# Patient Record
Sex: FEMALE | Race: WHITE | NOT HISPANIC OR LATINO | Employment: UNEMPLOYED | URBAN - METROPOLITAN AREA
[De-identification: names, ages, dates, MRNs, and addresses within clinical notes are randomized per-mention and may not be internally consistent; named-entity substitution may affect disease eponyms.]

---

## 2018-03-16 ENCOUNTER — HOSPITAL ENCOUNTER (OUTPATIENT)
Dept: RADIOLOGY | Facility: HOSPITAL | Age: 18
Discharge: HOME | End: 2018-03-16
Payer: COMMERCIAL

## 2018-03-16 ENCOUNTER — TRANSCRIBE ORDERS (OUTPATIENT)
Dept: ADMINISTRATIVE | Facility: HOSPITAL | Age: 18
End: 2018-03-16

## 2018-03-16 DIAGNOSIS — M06.09 RHEUMATOID ARTHRITIS OF MULTIPLE SITES WITHOUT RHEUMATOID FACTOR (HCC): Primary | ICD-10-CM

## 2018-03-16 DIAGNOSIS — M06.09 RHEUMATOID ARTHRITIS OF MULTIPLE SITES WITHOUT RHEUMATOID FACTOR (HCC): ICD-10-CM

## 2018-03-16 PROCEDURE — 73110 X-RAY EXAM OF WRIST: CPT

## 2018-03-16 PROCEDURE — 73630 X-RAY EXAM OF FOOT: CPT

## 2018-03-16 PROCEDURE — 73130 X-RAY EXAM OF HAND: CPT

## 2018-03-27 ENCOUNTER — TRANSCRIBE ORDERS (OUTPATIENT)
Dept: ADMINISTRATIVE | Facility: HOSPITAL | Age: 18
End: 2018-03-27

## 2018-03-27 DIAGNOSIS — M06.09 RHEUMATOID ARTHRITIS OF MULTIPLE SITES WITHOUT RHEUMATOID FACTOR (HCC): Primary | ICD-10-CM

## 2018-03-27 DIAGNOSIS — M79.671 RIGHT FOOT PAIN: ICD-10-CM

## 2018-04-03 ENCOUNTER — HOSPITAL ENCOUNTER (OUTPATIENT)
Dept: RADIOLOGY | Facility: HOSPITAL | Age: 18
Discharge: HOME | End: 2018-04-03

## 2018-05-16 ENCOUNTER — OFFICE VISIT (OUTPATIENT)
Dept: OBGYN CLINIC | Facility: CLINIC | Age: 18
End: 2018-05-16
Payer: COMMERCIAL

## 2018-05-16 VITALS
DIASTOLIC BLOOD PRESSURE: 78 MMHG | HEIGHT: 66 IN | BODY MASS INDEX: 27.64 KG/M2 | SYSTOLIC BLOOD PRESSURE: 126 MMHG | WEIGHT: 172 LBS

## 2018-05-16 DIAGNOSIS — N92.6 IRREGULAR MENSES: ICD-10-CM

## 2018-05-16 DIAGNOSIS — Z01.419 ENCNTR FOR GYN EXAM (GENERAL) (ROUTINE) W/O ABN FINDINGS: Primary | ICD-10-CM

## 2018-05-16 PROCEDURE — 99385 PREV VISIT NEW AGE 18-39: CPT | Performed by: NURSE PRACTITIONER

## 2018-05-16 RX ORDER — ERGOCALCIFEROL 1.25 MG/1
1 CAPSULE ORAL WEEKLY
Refills: 0 | COMMUNITY
Start: 2018-04-19 | End: 2019-05-14 | Stop reason: ALTCHOICE

## 2018-05-16 RX ORDER — PANTOPRAZOLE SODIUM 40 MG/1
1 TABLET, DELAYED RELEASE ORAL DAILY
COMMUNITY
Start: 2018-05-13 | End: 2019-05-15

## 2018-05-16 RX ORDER — MELOXICAM 7.5 MG/1
1 TABLET ORAL DAILY
COMMUNITY
Start: 2018-05-16 | End: 2019-05-15

## 2018-05-16 NOTE — PROGRESS NOTES
Assessment/Plan   Diagnoses and all orders for this visit:    Encntr for gyn exam (general) (routine) w/o abn findings    Irregular menses  -     Norethin-Eth Estrad-Fe Biphas 1 MG-10 MCG / 10 MCG TABS; Take 1 tablet by mouth daily    Other orders  -     meloxicam (MOBIC) 7 5 mg tablet; Take 1 tablet by mouth daily  -     pantoprazole (PROTONIX) 40 mg tablet; Take 1 tablet by mouth daily  -     ergocalciferol (VITAMIN D2) 50,000 units; Take 1 capsule by mouth once a week  -     Etonogestrel (NEXPLANON) 68 MG IMPL; Inject 68 mg under the skin continuous      Discussion    Reviewed with patient normal exam today  Reviewed monthly SBEs  Encourage safe sexual practices; STD testing done today  Will try one month of Lo Loestrin Fe to help with irregular bleeding on Nexplanon  Pap smears will start at age 22 per the ASCCP guidelines  All questions have been answered to her satisfaction  RTO for annul or sooner if needed    Subjective     Jake Lewis is a 19 y o  Female new partient who presents for annual well woman exam    Last exam: has never had one  Pap guidelines reviewed with patient, no pap needed until age of 22  Pt denies any abnormal vaginal discharge, itching, or odor  Pt in a mutually exclusive relationship with a male partner x 2 years and denies the need for STD testing today  Pt recently had STD testing at PCP one month ago was all negative  Denies a history of an STD in the past    Menstrual Cycle:   LMP: 18   Period Pattern: (!) Irregular (has Nexplanon in place)  Menstrual Flow: Light  Menstrual Control: Panty liner  Dysmenorrhea: None  OB History      Para Term  AB Living    0 0 0 0 0 0    SAB TAB Ectopic Multiple Live Births    0 0 0 0 0    Contraception: Nexplanon was placed 2017 working well would like to continue only complaint is irregular bleeding   Pt states she had no bleeding first 6 months after placement then had what seemed like a regular menses then has been bleeding since with the longest time of no bleeding having been 1 week  Pt states bleeding is not enough to wear a pad or tampon usually just light spotting  Practices monthly SBEs, no breast complaints today  Denies any bowel or bladder issues  Pt follows with PCP for regular check-ups and blood work  Review of Systems   Genitourinary: Positive for menstrual problem  Musculoskeletal: Positive for arthralgias  All other systems reviewed and are negative  The following portions of the patient's history were reviewed and updated as appropriate: allergies, current medications, past family history, past medical history, past social history, past surgical history and problem list     History reviewed  No pertinent past medical history  History reviewed  No pertinent surgical history  Family History   Problem Relation Age of Onset    Breast cancer Other        Social History     Social History    Marital status: Single     Spouse name: N/A    Number of children: N/A    Years of education: N/A     Occupational History    Not on file       Social History Main Topics    Smoking status: Never Smoker    Smokeless tobacco: Never Used    Alcohol use No    Drug use: No    Sexual activity: Yes     Partners: Male     Birth control/ protection: Implant     Other Topics Concern    Not on file     Social History Narrative    No narrative on file         Current Outpatient Prescriptions:     Etonogestrel (NEXPLANON) 68 MG IMPL, Inject 68 mg under the skin continuous, Disp: , Rfl:     ergocalciferol (VITAMIN D2) 50,000 units, Take 1 capsule by mouth once a week, Disp: , Rfl: 0    meloxicam (MOBIC) 7 5 mg tablet, Take 1 tablet by mouth daily, Disp: , Rfl:     Norethin-Eth Estrad-Fe Biphas 1 MG-10 MCG / 10 MCG TABS, Take 1 tablet by mouth daily, Disp: 28 tablet, Rfl: 0    pantoprazole (PROTONIX) 40 mg tablet, Take 1 tablet by mouth daily, Disp: , Rfl:     No Known Allergies    Objective Vitals:    05/16/18 1305   BP: 126/78   BP Location: Left arm   Patient Position: Sitting   Cuff Size: Large   Weight: 78 kg (172 lb)   Height: 5' 6" (1 676 m)     Physical Exam   Constitutional: She is oriented to person, place, and time  She appears well-developed and well-nourished  HENT:   Head: Normocephalic  Neck: Normal range of motion  Neck supple  No tracheal deviation present  No thyromegaly present  Cardiovascular: Normal rate, regular rhythm and normal heart sounds  Pulmonary/Chest: Effort normal and breath sounds normal  Right breast exhibits no inverted nipple, no mass, no nipple discharge, no skin change and no tenderness  Left breast exhibits no inverted nipple, no mass, no nipple discharge, no skin change and no tenderness  Breasts are symmetrical    Abdominal: Soft  Bowel sounds are normal  She exhibits no distension and no mass  There is no tenderness  There is no rebound and no guarding  Genitourinary: Vagina normal and uterus normal  No breast swelling, tenderness, discharge or bleeding  No labial fusion  There is no rash, tenderness, lesion or injury on the right labia  There is no rash, tenderness, lesion or injury on the left labia  Cervix exhibits no motion tenderness, no discharge and no friability  Right adnexum displays no mass, no tenderness and no fullness  Left adnexum displays no mass, no tenderness and no fullness  Musculoskeletal: Normal range of motion  Neurological: She is alert and oriented to person, place, and time  Skin: Skin is warm and dry  Psychiatric: She has a normal mood and affect   Her behavior is normal  Judgment and thought content normal

## 2019-03-29 ENCOUNTER — OFFICE VISIT (OUTPATIENT)
Dept: URGENT CARE | Facility: CLINIC | Age: 19
End: 2019-03-29
Payer: COMMERCIAL

## 2019-03-29 VITALS
DIASTOLIC BLOOD PRESSURE: 85 MMHG | TEMPERATURE: 98.7 F | WEIGHT: 174 LBS | HEIGHT: 65 IN | BODY MASS INDEX: 28.99 KG/M2 | SYSTOLIC BLOOD PRESSURE: 143 MMHG | HEART RATE: 87 BPM | RESPIRATION RATE: 18 BRPM | OXYGEN SATURATION: 98 %

## 2019-03-29 DIAGNOSIS — J20.8 ACUTE BACTERIAL BRONCHITIS: ICD-10-CM

## 2019-03-29 DIAGNOSIS — J45.21 MILD INTERMITTENT ASTHMA WITH ACUTE EXACERBATION: Primary | ICD-10-CM

## 2019-03-29 DIAGNOSIS — B96.89 ACUTE BACTERIAL BRONCHITIS: ICD-10-CM

## 2019-03-29 PROCEDURE — S9088 SERVICES PROVIDED IN URGENT: HCPCS | Performed by: FAMILY MEDICINE

## 2019-03-29 PROCEDURE — 99213 OFFICE O/P EST LOW 20 MIN: CPT | Performed by: FAMILY MEDICINE

## 2019-03-29 RX ORDER — ALBUTEROL SULFATE 90 UG/1
2 AEROSOL, METERED RESPIRATORY (INHALATION) EVERY 6 HOURS PRN
Qty: 1 INHALER | Refills: 0 | Status: SHIPPED | OUTPATIENT
Start: 2019-03-29 | End: 2019-07-16

## 2019-03-29 RX ORDER — PREDNISONE 50 MG/1
50 TABLET ORAL DAILY
Qty: 5 TABLET | Refills: 0 | Status: SHIPPED | OUTPATIENT
Start: 2019-03-29 | End: 2019-04-03

## 2019-03-29 RX ORDER — ALBUTEROL SULFATE 2.5 MG/3ML
2.5 SOLUTION RESPIRATORY (INHALATION) EVERY 6 HOURS PRN
Qty: 25 VIAL | Refills: 0 | Status: SHIPPED | OUTPATIENT
Start: 2019-03-29 | End: 2019-07-16

## 2019-03-29 RX ORDER — AZITHROMYCIN 250 MG/1
TABLET, FILM COATED ORAL
Qty: 6 TABLET | Refills: 0 | Status: SHIPPED | OUTPATIENT
Start: 2019-03-29 | End: 2019-04-03

## 2019-03-29 NOTE — PATIENT INSTRUCTIONS
Follow up with PCP in 3-5 days  Proceed to  ER if symptoms worsen  Bronchospasm   WHAT YOU NEED TO KNOW:   Bronchospasm is a narrowing of the airway that usually comes and goes  You may be at risk for bronchospasm if you have a chest cold or allergies  You may also be at risk if you are bothered by air pollution, certain medicines, cold, dry air, smoke, or strong odors  Exercise may worsen your symptoms  Bronchospasms may make it hard for you to breathe  DISCHARGE INSTRUCTIONS:   Medicines: You may need any of the following:  · Bronchodilators  help expand your airway for easier breathing  Some of these medicines may help prevent future spasms  · Inhaled steroids  help reduce swelling in your airway and soothe your breathing  These are used for long-term control  · Anticholinergics  help relax and open your airway  · Take your medicine as directed  Contact your healthcare provider if you think your medicine is not helping or if you have side effects  Tell him of her if you are allergic to any medicine  Keep a list of the medicines, vitamins, and herbs you take  Include the amounts, and when and why you take them  Bring the list or the pill bottles to follow-up visits  Carry your medicine list with you in case of an emergency  Follow up with your healthcare provider as directed: You may need more tests to find the cause of your condition  Write down your questions so you remember to ask them during your visits  Self-care:   · Avoid triggers  · Warm up before you exercise  Ask your healthcare provider about the best exercise plan for you  · Try to avoid people who are sick  Ask your healthcare provider if you need a flu or pneumonia vaccine  · Breathe through your nose when you are in cold, dry air or weather  This may help reduce lung irritation by warming the air before it reaches your lungs  Contact your healthcare provider if:   · You have a fever      · You have a cough that will not go away  · Your wheezing worsens  · You have questions or concerns about your condition or care  Return to the emergency department if:   · You cough or spit up blood  · You have trouble breathing  · You have blue fingernails or toenails  · You have chest pain  · You have a fast or uneven heartbeat  © 2017 [address] [employer] Information is for End User's use only and may not be sold, redistributed or otherwise used for commercial purposes  All illustrations and images included in CareNotes® are the copyrighted property of [employer]  or [employer]  The above information is an  only  It is not intended as medical advice for individual conditions or treatments  Talk to your doctor, nurse or pharmacist before following any medical regimen to see if it is safe and effective for you  Acute Bronchitis   AMBULATORY CARE:   Acute bronchitis  is swelling and irritation in the air passages of your lungs  This irritation may cause you to cough or have other breathing problems  Acute bronchitis often starts because of another illness, such as a cold or the flu  The illness spreads from your nose and throat to your windpipe and airways  Bronchitis is often called a chest cold  Acute bronchitis lasts about 3 to 6 weeks and is usually not a serious illness  Your cough can last for several weeks  You may have any of the following symptoms:   · A cough with sputum that may be clear, yellow, or green    · Feeling more tired than usual, and body aches    · A fever and chills    · Wheezing when you breathe    · A tight chest or pain when you breathe or cough  Seek care immediately if:   · You cough up blood  · Your lips or fingernails turn blue  · You feel like you are not getting enough air when you breathe  Contact your healthcare provider if:   · You have a fever  · Your breathing problems do not go away or get worse      · Your cough does not get better within 4 weeks  · You have questions or concerns about your condition or care  Self-care:   · Get more rest   Rest helps your body to heal  Slowly start to do more each day  Rest when you feel it is needed  · Avoid irritants in the air  Avoid chemicals, fumes, and dust  Wear a face mask if you must work around dust or fumes  Stay inside on days when air pollution levels are high  If you have allergies, stay inside when pollen counts are high  Do not use aerosol products, such as spray-on deodorant, bug spray, and hair spray  · Do not smoke or be around others who smoke  Nicotine and other chemicals in cigarettes and cigars damages the cilia that move mucus out of your lungs  Ask your healthcare provider for information if you currently smoke and need help to quit  E-cigarettes or smokeless tobacco still contain nicotine  Talk to your healthcare provider before you use these products  · Drink liquids as directed  Liquids help keep your air passages moist and help you cough up mucus  You may need to drink more liquids when you have acute bronchitis  Ask how much liquid to drink each day and which liquids are best for you  · Use a humidifier or vaporizer  Use a cool mist humidifier or a vaporizer to increase air moisture in your home  This may make it easier for you to breathe and help decrease your cough  Prevent acute bronchitis by doing the following:   · Get the vaccinations you need  Ask your healthcare provider if you should get vaccinated against the flu or pneumonia  · Prevent the spread of germs  You can decrease your risk of acute bronchitis and other illnesses by doing the following:     [facility] your hands often with soap and water  Carry germ-killing hand lotion or gel with you  You can use the lotion or gel to clean your hands when soap and water are not available      ¨ Do not touch your eyes, nose, or mouth unless you have washed your hands first     ¨ Always cover your mouth when you cough to prevent the spread of germs  It is best to cough into a tissue or your shirt sleeve instead of into your hand  Ask those around you cover their mouths when they cough  ¨ Try to avoid people who have a cold or the flu  If you are sick, stay away from others as much as possible  Medicines: Your healthcare provider may  give you any of the following:  · Ibuprofen or acetaminophen  are medicines that help lower your fever  They are available without a doctor's order  Ask your healthcare provider which medicine is right for you  Ask how much to take and how often to take it  Follow directions  These medicines can cause stomach bleeding if not taken correctly  Ibuprofen can cause kidney damage  Do not take ibuprofen if you have kidney disease, an ulcer, or allergies to aspirin  Acetaminophen can cause liver damage  Do not take more than 4,000 milligrams in 24 hours  · Decongestants  help loosen mucus in your lungs and make it easier to cough up  This can help you breathe easier  · Cough suppressants  decrease your urge to cough  If your cough produces mucus, do not take a cough suppressant unless your healthcare provider tells you to  Your healthcare provider may suggest that you take a cough suppressant at night so you can rest     · Inhalers  may be given  Your healthcare provider may give you one or more inhalers to help you breathe easier and cough less  An inhaler gives your medicine to open your airways  Ask your healthcare provider to show you how to use your inhaler correctly  Follow up with your healthcare provider as directed:  Write down questions you have so you will remember to ask them during your follow-up visits  © 2017 [address] [employer]  Information is for End User's use only and may not be sold, redistributed or otherwise used for commercial purposes   All illustrations and images included in CareNotes® are the copyrighted property of [employer]  or [facility] [employer]  The above information is an  only  It is not intended as medical advice for individual conditions or treatments  Talk to your doctor, nurse or pharmacist before following any medical regimen to see if it is safe and effective for you

## 2019-03-29 NOTE — PROGRESS NOTES
[facility]        NAME: Pawel Patel is a 22 y o  female  : 2000    MRN: [id number]  DATE: 2019  TIME: 10:34 AM    Assessment and Plan   Mild intermittent asthma with acute exacerbation [J45 21]  1  Mild intermittent asthma with acute exacerbation  predniSONE 50 mg tablet    albuterol (2 5 mg/3 mL) 0 083 % nebulizer solution    albuterol (PROVENTIL HFA,VENTOLIN HFA) 90 mcg/act inhaler   2  Acute bacterial bronchitis  azithromycin (ZITHROMAX) 250 mg tablet         Patient Instructions       Follow up with PCP in 3-5 days  Proceed to  ER if symptoms worsen  Chief Complaint     Chief Complaint   Patient presents with    Wheezing     PT has a history of asthma, but has not had an attack in x 3 years, but within the last week it she's had heavy wheezing and shortness of breath that has been keeping her up at night  History of Present Illness       Wheezing (PT has a history of asthma, but has not had an attack in x 3 years, but within the last week it she's had heavy wheezing and shortness of breath that has been keeping her up at night )  Patient is using her nebulizer twice a day, and her  rescue inhaler 2 to 3 times a day  Wheezing    This is a new problem  The current episode started in the past 7 days  The problem occurs intermittently  The problem has been gradually worsening  Associated symptoms include chills and coughing  Pertinent negatives include no rhinorrhea  She has tried beta agonist inhalers for the symptoms  The treatment provided no relief  Her past medical history is significant for asthma  Review of Systems   Review of Systems   Constitutional: Positive for chills  HENT: Negative for rhinorrhea  Respiratory: Positive for cough and wheezing  Cardiovascular: Negative            Current Medications       Current Outpatient Medications:     Etonogestrel (NEXPLANON) 68 MG IMPL, Inject 68 mg under the skin continuous, Disp: , Rfl:    albuterol (2 5 mg/3 mL) 0 083 % nebulizer solution, Take 1 vial (2 5 mg total) by nebulization every 6 (six) hours as needed for wheezing or shortness of breath, Disp: 25 vial, Rfl: 0    albuterol (PROVENTIL HFA,VENTOLIN HFA) 90 mcg/act inhaler, Inhale 2 puffs every 6 (six) hours as needed for wheezing, Disp: 1 Inhaler, Rfl: 0    azithromycin (ZITHROMAX) 250 mg tablet, Take 2 tablets today then 1 tablet daily x 4 days, Disp: 6 tablet, Rfl: 0    ergocalciferol (VITAMIN D2) 50,000 units, Take 1 capsule by mouth once a week, Disp: , Rfl: 0    meloxicam (MOBIC) 7 5 mg tablet, Take 1 tablet by mouth daily, Disp: , Rfl:     Norethin-Eth Estrad-Fe Biphas 1 MG-10 MCG / 10 MCG TABS, Take 1 tablet by mouth daily (Patient not taking: Reported on 3/29/2019), Disp: 28 tablet, Rfl: 0    pantoprazole (PROTONIX) 40 mg tablet, Take 1 tablet by mouth daily, Disp: , Rfl:     predniSONE 50 mg tablet, Take 1 tablet (50 mg total) by mouth daily for 5 days, Disp: 5 tablet, Rfl: 0    Current Allergies     Allergies as of 03/29/2019    (No Known Allergies)            The following portions of the patient's history were reviewed and updated as appropriate: allergies, current medications, past family history, past medical history, past social history, past surgical history and problem list      Past Medical History:   Diagnosis Date    Asthma        History reviewed  No pertinent surgical history  Family History   Problem Relation Age of Onset    Breast cancer Other          Medications have been verified  Objective   /85 (BP Location: Right arm, Patient Position: Sitting, Cuff Size: Standard)   Pulse 87   Temp 98 7 °F (37 1 °C) (Tympanic)   Resp 18   Ht 5' 5" (1 651 m)   Wt 78 9 kg (174 lb)   SpO2 98%   BMI 28 96 kg/m²        Physical Exam     Physical Exam   Constitutional: She is oriented to person, place, and time  She appears well-developed and well-nourished     HENT:   Right Ear: External ear normal  Left Ear: External ear normal    Nose: Nose normal    Mouth/Throat: Oropharynx is clear and moist  No oropharyngeal exudate  Eyes: Conjunctivae are normal    Neck: Normal range of motion  Neck supple  Cardiovascular: Normal rate, regular rhythm and normal heart sounds  No murmur heard  Pulmonary/Chest: Effort normal  No respiratory distress  She has wheezes  She has no rales  She exhibits no tenderness  Abdominal: Soft  Musculoskeletal: Normal range of motion  Lymphadenopathy:     She has no cervical adenopathy  Neurological: She is alert and oriented to person, place, and time  Skin: Skin is warm  No rash noted  No erythema

## 2019-04-16 ENCOUNTER — OFFICE VISIT (OUTPATIENT)
Dept: FAMILY MEDICINE CLINIC | Facility: CLINIC | Age: 19
End: 2019-04-16
Payer: COMMERCIAL

## 2019-04-16 VITALS
SYSTOLIC BLOOD PRESSURE: 118 MMHG | HEART RATE: 96 BPM | DIASTOLIC BLOOD PRESSURE: 70 MMHG | OXYGEN SATURATION: 98 % | BODY MASS INDEX: 29.16 KG/M2 | WEIGHT: 175 LBS | HEIGHT: 65 IN

## 2019-04-16 DIAGNOSIS — E66.3 OVERWEIGHT (BMI 25.0-29.9): ICD-10-CM

## 2019-04-16 DIAGNOSIS — R53.82 CHRONIC FATIGUE: ICD-10-CM

## 2019-04-16 DIAGNOSIS — Z13.220 SCREENING FOR HYPERLIPIDEMIA: Primary | ICD-10-CM

## 2019-04-16 DIAGNOSIS — Z76.89 ENCOUNTER TO ESTABLISH CARE: ICD-10-CM

## 2019-04-16 PROCEDURE — 99204 OFFICE O/P NEW MOD 45 MIN: CPT | Performed by: NURSE PRACTITIONER

## 2019-05-07 ENCOUNTER — TELEPHONE (OUTPATIENT)
Dept: OBGYN CLINIC | Facility: CLINIC | Age: 19
End: 2019-05-07

## 2019-05-07 ENCOUNTER — APPOINTMENT (OUTPATIENT)
Dept: LAB | Facility: HOSPITAL | Age: 19
End: 2019-05-07
Payer: COMMERCIAL

## 2019-05-07 DIAGNOSIS — R53.82 CHRONIC FATIGUE: ICD-10-CM

## 2019-05-07 DIAGNOSIS — Z13.220 SCREENING FOR HYPERLIPIDEMIA: ICD-10-CM

## 2019-05-07 LAB
ALBUMIN SERPL BCP-MCNC: 3.9 G/DL (ref 3.5–5)
ALP SERPL-CCNC: 56 U/L (ref 46–116)
ALT SERPL W P-5'-P-CCNC: 35 U/L (ref 12–78)
ANION GAP SERPL CALCULATED.3IONS-SCNC: 8 MMOL/L (ref 4–13)
AST SERPL W P-5'-P-CCNC: 13 U/L (ref 5–45)
BASOPHILS # BLD AUTO: 0.07 THOUSANDS/ΜL (ref 0–0.1)
BASOPHILS NFR BLD AUTO: 1 % (ref 0–1)
BILIRUB SERPL-MCNC: 0.4 MG/DL (ref 0.2–1)
BUN SERPL-MCNC: 11 MG/DL (ref 5–25)
CALCIUM SERPL-MCNC: 9.2 MG/DL (ref 8.3–10.1)
CHLORIDE SERPL-SCNC: 106 MMOL/L (ref 100–108)
CHOLEST SERPL-MCNC: 151 MG/DL (ref 50–200)
CO2 SERPL-SCNC: 26 MMOL/L (ref 21–32)
CREAT SERPL-MCNC: 0.7 MG/DL (ref 0.6–1.3)
EOSINOPHIL # BLD AUTO: 0.19 THOUSAND/ΜL (ref 0–0.61)
EOSINOPHIL NFR BLD AUTO: 3 % (ref 0–6)
ERYTHROCYTE [DISTWIDTH] IN BLOOD BY AUTOMATED COUNT: 12.1 % (ref 11.6–15.1)
GFR SERPL CREATININE-BSD FRML MDRD: 124 ML/MIN/1.73SQ M
GLUCOSE P FAST SERPL-MCNC: 93 MG/DL (ref 65–99)
HCT VFR BLD AUTO: 42.9 % (ref 34.8–46.1)
HDLC SERPL-MCNC: 54 MG/DL (ref 40–60)
HGB BLD-MCNC: 14.3 G/DL (ref 11.5–15.4)
IMM GRANULOCYTES # BLD AUTO: 0.01 THOUSAND/UL (ref 0–0.2)
IMM GRANULOCYTES NFR BLD AUTO: 0 % (ref 0–2)
LDLC SERPL CALC-MCNC: 91 MG/DL (ref 0–100)
LYMPHOCYTES # BLD AUTO: 2.34 THOUSANDS/ΜL (ref 0.6–4.47)
LYMPHOCYTES NFR BLD AUTO: 33 % (ref 14–44)
MCH RBC QN AUTO: 30.7 PG (ref 26.8–34.3)
MCHC RBC AUTO-ENTMCNC: 33.3 G/DL (ref 31.4–37.4)
MCV RBC AUTO: 92 FL (ref 82–98)
MONOCYTES # BLD AUTO: 0.52 THOUSAND/ΜL (ref 0.17–1.22)
MONOCYTES NFR BLD AUTO: 7 % (ref 4–12)
NEUTROPHILS # BLD AUTO: 3.87 THOUSANDS/ΜL (ref 1.85–7.62)
NEUTS SEG NFR BLD AUTO: 56 % (ref 43–75)
NONHDLC SERPL-MCNC: 97 MG/DL
NRBC BLD AUTO-RTO: 0 /100 WBCS
PLATELET # BLD AUTO: 301 THOUSANDS/UL (ref 149–390)
PMV BLD AUTO: 10.1 FL (ref 8.9–12.7)
POTASSIUM SERPL-SCNC: 4.2 MMOL/L (ref 3.5–5.3)
PROT SERPL-MCNC: 7.6 G/DL (ref 6.4–8.2)
RBC # BLD AUTO: 4.66 MILLION/UL (ref 3.81–5.12)
SODIUM SERPL-SCNC: 140 MMOL/L (ref 136–145)
TRIGL SERPL-MCNC: 30 MG/DL
TSH SERPL DL<=0.05 MIU/L-ACNC: 1.03 UIU/ML (ref 0.36–3.74)
WBC # BLD AUTO: 7 THOUSAND/UL (ref 4.31–10.16)

## 2019-05-07 PROCEDURE — 82306 VITAMIN D 25 HYDROXY: CPT

## 2019-05-07 PROCEDURE — 84443 ASSAY THYROID STIM HORMONE: CPT

## 2019-05-07 PROCEDURE — 36415 COLL VENOUS BLD VENIPUNCTURE: CPT

## 2019-05-07 PROCEDURE — 80053 COMPREHEN METABOLIC PANEL: CPT

## 2019-05-07 PROCEDURE — 80061 LIPID PANEL: CPT

## 2019-05-07 PROCEDURE — 85025 COMPLETE CBC W/AUTO DIFF WBC: CPT

## 2019-05-11 LAB
25(OH)D2 SERPL-MCNC: 4 NG/ML
25(OH)D3 SERPL-MCNC: 11 NG/ML
25(OH)D3+25(OH)D2 SERPL-MCNC: 15 NG/ML

## 2019-05-14 ENCOUNTER — TELEPHONE (OUTPATIENT)
Dept: FAMILY MEDICINE CLINIC | Facility: CLINIC | Age: 19
End: 2019-05-14

## 2019-05-14 DIAGNOSIS — E55.9 HYPOVITAMINOSIS D: Primary | ICD-10-CM

## 2019-05-14 RX ORDER — ERGOCALCIFEROL 1.25 MG/1
50000 CAPSULE ORAL 2 TIMES WEEKLY
Qty: 16 CAPSULE | Refills: 0 | Status: SHIPPED | OUTPATIENT
Start: 2019-05-16 | End: 2019-07-16 | Stop reason: SDUPTHER

## 2019-05-15 ENCOUNTER — OFFICE VISIT (OUTPATIENT)
Dept: FAMILY MEDICINE CLINIC | Facility: CLINIC | Age: 19
End: 2019-05-15
Payer: COMMERCIAL

## 2019-05-15 VITALS
WEIGHT: 179 LBS | TEMPERATURE: 99.5 F | OXYGEN SATURATION: 99 % | HEIGHT: 65 IN | BODY MASS INDEX: 29.82 KG/M2 | SYSTOLIC BLOOD PRESSURE: 120 MMHG | DIASTOLIC BLOOD PRESSURE: 80 MMHG | RESPIRATION RATE: 16 BRPM | HEART RATE: 94 BPM

## 2019-05-15 DIAGNOSIS — R53.82 CHRONIC FATIGUE: ICD-10-CM

## 2019-05-15 DIAGNOSIS — F34.1 DYSTHYMIA: Primary | ICD-10-CM

## 2019-05-15 DIAGNOSIS — E66.3 OVERWEIGHT (BMI 25.0-29.9): ICD-10-CM

## 2019-05-15 DIAGNOSIS — Z13.220 SCREENING FOR HYPERLIPIDEMIA: ICD-10-CM

## 2019-05-15 PROBLEM — Z76.89 ENCOUNTER TO ESTABLISH CARE: Status: RESOLVED | Noted: 2019-04-16 | Resolved: 2019-05-15

## 2019-05-15 PROCEDURE — 3008F BODY MASS INDEX DOCD: CPT | Performed by: NURSE PRACTITIONER

## 2019-05-15 PROCEDURE — 1036F TOBACCO NON-USER: CPT | Performed by: NURSE PRACTITIONER

## 2019-05-15 PROCEDURE — 99214 OFFICE O/P EST MOD 30 MIN: CPT | Performed by: NURSE PRACTITIONER

## 2019-05-15 RX ORDER — FLUOXETINE 10 MG/1
10 TABLET, FILM COATED ORAL DAILY
Qty: 30 TABLET | Refills: 3 | Status: SHIPPED | OUTPATIENT
Start: 2019-05-15 | End: 2019-07-16 | Stop reason: SDUPTHER

## 2019-05-21 PROBLEM — Z01.419 WELL WOMAN EXAM: Status: ACTIVE | Noted: 2019-05-21

## 2019-06-06 ENCOUNTER — TELEPHONE (OUTPATIENT)
Dept: OBGYN CLINIC | Facility: CLINIC | Age: 19
End: 2019-06-06

## 2019-06-13 ENCOUNTER — TELEPHONE (OUTPATIENT)
Dept: OBGYN CLINIC | Facility: CLINIC | Age: 19
End: 2019-06-13

## 2019-06-28 DIAGNOSIS — J45.21 MILD INTERMITTENT ASTHMA WITH ACUTE EXACERBATION: ICD-10-CM

## 2019-07-16 ENCOUNTER — OFFICE VISIT (OUTPATIENT)
Dept: FAMILY MEDICINE CLINIC | Facility: CLINIC | Age: 19
End: 2019-07-16
Payer: COMMERCIAL

## 2019-07-16 VITALS
HEIGHT: 65 IN | RESPIRATION RATE: 18 BRPM | BODY MASS INDEX: 28.49 KG/M2 | SYSTOLIC BLOOD PRESSURE: 120 MMHG | DIASTOLIC BLOOD PRESSURE: 74 MMHG | HEART RATE: 89 BPM | TEMPERATURE: 97.6 F | OXYGEN SATURATION: 98 % | WEIGHT: 171 LBS

## 2019-07-16 DIAGNOSIS — E55.9 HYPOVITAMINOSIS D: ICD-10-CM

## 2019-07-16 DIAGNOSIS — F34.1 DYSTHYMIA: Primary | ICD-10-CM

## 2019-07-16 DIAGNOSIS — J45.21 MILD INTERMITTENT ASTHMA WITH ACUTE EXACERBATION: ICD-10-CM

## 2019-07-16 PROCEDURE — 3008F BODY MASS INDEX DOCD: CPT | Performed by: NURSE PRACTITIONER

## 2019-07-16 PROCEDURE — 99214 OFFICE O/P EST MOD 30 MIN: CPT | Performed by: NURSE PRACTITIONER

## 2019-07-16 RX ORDER — ALBUTEROL SULFATE 90 UG/1
2 AEROSOL, METERED RESPIRATORY (INHALATION) EVERY 6 HOURS PRN
Qty: 1 EACH | Refills: 2 | Status: SHIPPED | OUTPATIENT
Start: 2019-07-16 | End: 2019-09-19 | Stop reason: SDUPTHER

## 2019-07-16 RX ORDER — ERGOCALCIFEROL 1.25 MG/1
50000 CAPSULE ORAL 2 TIMES WEEKLY
Qty: 16 CAPSULE | Refills: 0 | Status: SHIPPED | OUTPATIENT
Start: 2019-07-18 | End: 2019-09-19

## 2019-07-16 RX ORDER — FLUOXETINE 10 MG/1
10 TABLET, FILM COATED ORAL DAILY
Qty: 90 TABLET | Refills: 3 | Status: SHIPPED | OUTPATIENT
Start: 2019-07-16 | End: 2019-09-19 | Stop reason: SDUPTHER

## 2019-07-16 NOTE — PATIENT INSTRUCTIONS
Continue medications as ordered  Continue Claritin daily  Use inhaler as needed  Get enough rest enough hydration and nutrition    Heart Healthy Diet   WHAT YOU NEED TO KNOW:   A heart healthy diet is an eating plan low in total fat, unhealthy fats, and sodium (salt)  A heart healthy diet helps decrease your risk for heart disease and stroke  Limit the amount of fat you eat to 25% to 35% of your total daily calories  Limit sodium to less than 2,300 mg each day  DISCHARGE INSTRUCTIONS:   Healthy fats:  Healthy fats can help improve cholesterol levels  The risk for heart disease is decreased when cholesterol levels are normal  Choose healthy fats, such as the following:  · Unsaturated fat  is found in foods such as soybean, canola, olive, corn, and safflower oils  It is also found in soft tub margarine that is made with liquid vegetable oil  · Omega-3 fat  is found in certain fish, such as salmon, tuna, and trout, and in walnuts and flaxseed  Unhealthy fats:  Unhealthy fats can cause unhealthy cholesterol levels in your blood and increase your risk of heart disease  Limit unhealthy fats, such as the following:  · Cholesterol  is found in animal foods, such as eggs and lobster, and in dairy products made from whole milk  Limit cholesterol to less than 300 milligrams (mg) each day  You may need to limit cholesterol to 200 mg each day if you have heart disease  · Saturated fat  is found in meats, such as alatorre and hamburger  It is also found in chicken or turkey skin, whole milk, and butter  Limit saturated fat to less than 7% of your total daily calories  Limit saturated fat to less than 6% if you have heart disease or are at increased risk for it  · Trans fat  is found in packaged foods, such as potato chips and cookies  It is also in hard margarine, some fried foods, and shortening  Avoid trans fats as much as possible    Heart healthy foods and drinks to include:  Ask your dietitian or healthcare provider how many servings to have from each of the following food groups:  · Grains:      ¨ Whole-wheat breads, cereals, and pastas, and brown rice    ¨ Low-fat, low-sodium crackers and chips    · Vegetables:      ¨ Broccoli, green beans, green peas, and spinach    ¨ Collards, kale, and lima beans    ¨ Carrots, sweet potatoes, tomatoes, and peppers    ¨ Canned vegetables with no salt added    · Fruits:      ¨ Bananas, peaches, pears, and pineapple    ¨ Grapes, raisins, and dates    ¨ Oranges, tangerines, grapefruit, orange juice, and grapefruit juice    ¨ Apricots, mangoes, melons, and papaya    ¨ Raspberries and strawberries    ¨ Canned fruit with no added sugar    · Low-fat dairy products:      ¨ Nonfat (skim) milk, 1% milk, and low-fat almond, cashew, or soy milks fortified with calcium    ¨ Low-fat cheese, regular or frozen yogurt, and cottage cheese    · Meats and proteins , such as lean cuts of beef and pork (loin, leg, round), skinless chicken and turkey, legumes, soy products, egg whites, and nuts  Foods and drinks to limit or avoid:  Ask your dietitian or healthcare provider about these and other foods that are high in unhealthy fat, sodium, and sugar:  · Snack or packaged foods , such as frozen dinners, cookies, macaroni and cheese, and cereals with more than 300 mg of sodium per serving    · Canned or dry mixes  for cakes, soups, sauces, or gravies    · Vegetables with added sodium , such as instant potatoes, vegetables with added sauces, or regular canned vegetables    · Other foods high in sodium , such as ketchup, barbecue sauce, salad dressing, pickles, olives, soy sauce, and miso    · High-fat dairy foods  such as whole or 2% milk, cream cheese, or sour cream, and cheeses     · High-fat protein foods  such as high-fat cuts of beef (T-bone steaks, ribs), chicken or turkey with skin, and organ meats, such as liver    · Cured or smoked meats , such as hot dogs, alatorre, and sausage    · Unhealthy fats and oils , such as butter, stick margarine, shortening, and cooking oils such as coconut or palm oil    · Food and drinks high in sugar , such as soft drinks (soda), sports drinks, sweetened tea, candy, cake, cookies, pies, and doughnuts  Other diet guidelines to follow:   · Eat more foods containing omega-3 fats  Eat fish high in omega-3 fats at least 2 times a week  · Limit alcohol  Too much alcohol can damage your heart and raise your blood pressure  Women should limit alcohol to 1 drink a day  Men should limit alcohol to 2 drinks a day  A drink of alcohol is 12 ounces of beer, 5 ounces of wine, or 1½ ounces of liquor  · Choose low-sodium foods  High-sodium foods can lead to high blood pressure  Add little or no salt to food you prepare  Use herbs and spices in place of salt  · Eat more fiber  to help lower cholesterol levels  Eat at least 5 servings of fruits and vegetables each day  Eat 3 ounces of whole-grain foods each day  Legumes (beans) are also a good source of fiber  Lifestyle guidelines:   · Do not smoke  Nicotine and other chemicals in cigarettes and cigars can cause lung and heart damage  Ask your healthcare provider for information if you currently smoke and need help to quit  E-cigarettes or smokeless tobacco still contain nicotine  Talk to your healthcare provider before you use these products  · Exercise regularly  to help you maintain a healthy weight and improve your blood pressure and cholesterol levels  Ask your healthcare provider about the best exercise plan for you  Do not start an exercise program without asking your healthcare provider  Follow up with your healthcare provider as directed:  Write down your questions so you remember to ask them during your visits  © 2017 [employer] [employer] Information is for End User's use only and may not be sold, redistributed or otherwise used for commercial purposes   All illustrations and images included in CareNotes® are the copyrighted property of A D A M , Inc  or Manuel Santiago  The above information is an  only  It is not intended as medical advice for individual conditions or treatments  Talk to your doctor, nurse or pharmacist before following any medical regimen to see if it is safe and effective for you

## 2019-07-16 NOTE — ASSESSMENT & PLAN NOTE
Patient reports that the Prozac has been helping her  Would like to keep the dose at 10 mg  States that she does not feel is anxious  Her mood has improved  Advised to continue taking medication  Patient also advised to  vitamin-D and finish treatment

## 2019-07-16 NOTE — ASSESSMENT & PLAN NOTE
Use an allergy pill daily  Use inhaler as needed  Patient advised to call if she has increased use of inhaler    Will continue to monitor

## 2019-07-16 NOTE — ASSESSMENT & PLAN NOTE
Vitamin-D ordered  Patient stated that it was sent to the wrong pharmacy  Recent to appropriate pharmacy  Advised to take medication twice a week for 8 weeks

## 2019-07-16 NOTE — PROGRESS NOTES
Assessment/Plan:    Dysthymia  Patient reports that the Prozac has been helping her  Would like to keep the dose at 10 mg  States that she does not feel is anxious  Her mood has improved  Advised to continue taking medication  Patient also advised to  vitamin-D and finish treatment  Hypovitaminosis D  Vitamin-D ordered  Patient stated that it was sent to the wrong pharmacy  Recent to appropriate pharmacy  Advised to take medication twice a week for 8 weeks  Mild intermittent asthma with acute exacerbation  Use an allergy pill daily  Use inhaler as needed  Patient advised to call if she has increased use of inhaler  Will continue to monitor         Problem List Items Addressed This Visit        Respiratory    Mild intermittent asthma with acute exacerbation     Use an allergy pill daily  Use inhaler as needed  Patient advised to call if she has increased use of inhaler  Will continue to monitor         Relevant Medications    albuterol (VENTOLIN HFA) 90 mcg/act inhaler       Other    Dysthymia - Primary     Patient reports that the Prozac has been helping her  Would like to keep the dose at 10 mg  States that she does not feel is anxious  Her mood has improved  Advised to continue taking medication  Patient also advised to  vitamin-D and finish treatment  Relevant Medications    FLUoxetine (PROzac) 10 MG tablet    Hypovitaminosis D     Vitamin-D ordered  Patient stated that it was sent to the wrong pharmacy  Recent to appropriate pharmacy  Advised to take medication twice a week for 8 weeks  Relevant Medications    ergocalciferol (VITAMIN D2) 50,000 units (Start on 7/18/2019)            Subjective:      Patient ID: Radames Roldan is a 22 y o  female  Patient is here for follow-up for her depression  States that she has been taking the Prozac and feels a lot better  Still continues to work at [employer]    She is finishing her 2nd year at [employer] and possibly going on [employer]  Her goal is to study law  Patient reports not gaining weight  Patient also reports using her inhaler more frequently because of allergies  She has been taking Claritin  The following portions of the patient's history were reviewed and updated as appropriate: allergies, current medications, past family history, past medical history, past social history, past surgical history and problem list     Review of Systems   Constitutional: Negative  HENT: Negative  Eyes: Negative  Respiratory: Positive for shortness of breath ( from allergies)  Cardiovascular: Negative  Gastrointestinal: Negative  Endocrine: Negative  Genitourinary: Negative  Musculoskeletal: Negative  Allergic/Immunologic: Negative  Neurological: Negative  Psychiatric/Behavioral: Positive for dysphoric mood ( patient has been taking Prozac with good outcome)  The patient is nervous/anxious (Has decreased medication)  Objective:      /74   Pulse 89   Temp 97 6 °F (36 4 °C)   Resp 18   Ht 5' 5" (1 651 m)   Wt 77 6 kg (171 lb)   SpO2 98%   BMI 28 46 kg/m²          Physical Exam   Constitutional: She is oriented to person, place, and time  She appears well-developed and well-nourished  HENT:   Head: Normocephalic and atraumatic  Right Ear: External ear normal    Left Ear: External ear normal    Eyes: Pupils are equal, round, and reactive to light  Neck: Normal range of motion  Cardiovascular: Normal rate and regular rhythm  Pulmonary/Chest: Effort normal    Abdominal: Soft  Bowel sounds are normal    Musculoskeletal: Normal range of motion  Neurological: She is alert and oriented to person, place, and time  Skin: Skin is warm and dry  Psychiatric: She has a normal mood and affect  Her behavior is normal  Judgment and thought content normal    Nursing note and vitals reviewed          Labs:    Lab Results   Component Value Date    WBC 7 00 05/07/2019    HGB 14 3 05/07/2019    HCT 42 9 05/07/2019    MCV 92 05/07/2019     05/07/2019     Lab Results   Component Value Date    K 4 2 05/07/2019     05/07/2019    CO2 26 05/07/2019    BUN 11 05/07/2019    CREATININE 0 70 05/07/2019    GLUF 93 05/07/2019    CALCIUM 9 2 05/07/2019    AST 13 05/07/2019    ALT 35 05/07/2019    ALKPHOS 56 05/07/2019    EGFR 124 05/07/2019     Lab Results   Component Value Date    CALCIUM 9 2 05/07/2019    K 4 2 05/07/2019    CO2 26 05/07/2019     05/07/2019    BUN 11 05/07/2019    CREATININE 0 70 05/07/2019

## 2019-07-17 ENCOUNTER — ANNUAL EXAM (OUTPATIENT)
Dept: OBGYN CLINIC | Facility: CLINIC | Age: 19
End: 2019-07-17
Payer: COMMERCIAL

## 2019-07-17 VITALS
SYSTOLIC BLOOD PRESSURE: 116 MMHG | BODY MASS INDEX: 27.8 KG/M2 | DIASTOLIC BLOOD PRESSURE: 78 MMHG | HEIGHT: 66 IN | WEIGHT: 173 LBS

## 2019-07-17 DIAGNOSIS — Z30.09 FAMILY PLANNING ADVICE: ICD-10-CM

## 2019-07-17 DIAGNOSIS — Z01.419 ENCOUNTER FOR GYNECOLOGICAL EXAMINATION (GENERAL) (ROUTINE) WITHOUT ABNORMAL FINDINGS: Primary | ICD-10-CM

## 2019-07-17 PROCEDURE — 99395 PREV VISIT EST AGE 18-39: CPT | Performed by: PHYSICIAN ASSISTANT

## 2019-07-17 PROCEDURE — G0145 SCR C/V CYTO,THINLAYER,RESCR: HCPCS | Performed by: PHYSICIAN ASSISTANT

## 2019-07-17 RX ORDER — LORATADINE 10 MG/1
10 TABLET ORAL DAILY
COMMUNITY

## 2019-07-17 NOTE — PROGRESS NOTES
Assessment/Plan   Diagnoses and all orders for this visit:    Encounter for gynecological examination (general) (routine) without abnormal findings  -     Liquid-based pap, screening  The current ASCCP guidelines were reviewed  Patient's last pap was never and therefore, a pap is indicated at this time  I emphasized the importance of an annual pelvic and breast exam  Patient ok to have a pap done today  Family planning advice  Patient aware her nexplanon is no longer effective for birth control at this time  Patient desires removal and reinsertion of a new nexplanon at this time  Consent form already signed and insurance coverage verified - she just needs to schedule - will have Shreya Kauffman reach out to her for scheduling considering she was tied up on a phone call when patient was checking out  Discussed Nexplanon as a birth control option in detail  Placed for 3 years  Reviewed the most common side effects of dysfunctional uterine bleeding for the first few months after insertion, headaches, breast tenderness, and mood fluctuations  Most women's cycles will regulate to one period each month  Some women will experience amenorrhea and some women will experience a heavier, more crampy period  Reviewed insertion and removal process in the office  Small risk of infection after the procedure, can't lift same arm for 24 hours, fertility should return after 1 month of removal  Small risk of migrating or cannot located Nexplanon upon removal which would require further surgical procedure  Consent signed and patient should expect a phone call from our office once insurance information obtained  Patient aware will be contacted by office in regards to coverage, ordering, and scheduling  Discussion  I have discussed the importance of monthly self-breast exams, exercise and healthy diet as well as adequate intake of calcium and vitamin D  Encourage MVI q day and r/marc importance of folic acid;  Encourage 30-40 min weight bearing exercise most days of week  Encourage safe sexual practices; STD testing - declines  The patient has had the Gardasil vaccine series, which is recommended for patients from 545 years of age - she is unsure if she completed the entire series - encourage patient to verify with PCP or mother and can reach out to our office if needs to complete the series  All questions have been answered to her satisfaction  RTO for APE or sooner if needed    Subjective     HPI   Vicente Padilla is a 22 y o  female who presents for annual well woman exam    Menarche - 11; LMP - 1 week ago; Periods are irreg with the nexplanon in place - for the past 4 months patient has been getting her period about 2x/month - lasting 7 days so feels like bleeding half the month; Prior to that period was irregular where she would get it some months and others not; last year trialed a pack of LoLoestrin Fe to help stabilize uterine lining which did help at that time; No excessive bleeding; Cramps are tolerable  She is actually overdue for removal - placed 6/2016  No vulvar itch/burn; No vaginal itch/burn; No abn discharge or odor; No urinary sx - burning/pain/frequency/hematuria  (+) SBEs - no breast masses, asymmetry, nipple discharge or bleeding, changes in skin of breast, or breast tenderness bilaterally  No abd/pelvic pain or HAs;   Pt is sexually active in a mutually monog sexual relationship x 3 yrs; No issues with intercourse; She declines std/hiv/hep testing; Feels safe at home  Current contraception: Nexplanon placed 6/2016  Condom use: no  Gardasil - per patient received all 3 shots;  (+) PCP for routine Bw/care; Last Pap - none  History of abnormal Pap smear:   Last STD screen - unknown    Review of Systems   Constitutional: Negative for activity change, fatigue, fever and unexpected weight change  HENT: Negative for congestion, dental problem, sinus pressure and sinus pain  Eyes: Negative for visual disturbance     Respiratory: Negative for cough, shortness of breath and wheezing  Cardiovascular: Negative for chest pain and leg swelling  Gastrointestinal: Negative for abdominal distention, abdominal pain, blood in stool, constipation, diarrhea, nausea and vomiting  Endocrine: Negative for polydipsia  Genitourinary: Negative for difficulty urinating, dyspareunia, dysuria, frequency, hematuria, menstrual problem, pelvic pain, urgency, vaginal bleeding, vaginal discharge and vaginal pain  Musculoskeletal: Negative for arthralgias and back pain  Allergic/Immunologic: Negative for environmental allergies  Neurological: Negative for dizziness, seizures and headaches  Psychiatric/Behavioral: Negative for dysphoric mood and sleep disturbance  The patient is not nervous/anxious          The following portions of the patient's history were reviewed and updated as appropriate: allergies, current medications, past family history, past medical history, past social history, past surgical history and problem list          OB History        0    Para   0    Term   0       0    AB   0    Living   0       SAB   0    TAB   0    Ectopic   0    Multiple   0    Live Births   0                 Past Medical History:   Diagnosis Date    Asthma     Dysthymia 5/15/2019       Past Surgical History:   Procedure Laterality Date    NO PAST SURGERIES         Family History   Problem Relation Age of Onset    Breast cancer Other     No Known Problems Mother     No Known Problems Father        Social History     Socioeconomic History    Marital status: Single     Spouse name: Not on file    Number of children: Not on file    Years of education: Not on file    Highest education level: Not on file   Occupational History    Not on file   Social Needs    Financial resource strain: Not on file    Food insecurity:     Worry: Not on file     Inability: Not on file    Transportation needs:     Medical: Not on file     Non-medical: Not on file   Tobacco Use    Smoking status: Never Smoker    Smokeless tobacco: Never Used   Substance and Sexual Activity    Alcohol use: No    Drug use: No    Sexual activity: Yes     Partners: Male     Birth control/protection: Implant     Comment: Nexplanon placed 6/2016   Lifestyle    Physical activity:     Days per week: Not on file     Minutes per session: Not on file    Stress: Not on file   Relationships    Social connections:     Talks on phone: Not on file     Gets together: Not on file     Attends Zoroastrianism service: Not on file     Active member of club or organization: Not on file     Attends meetings of clubs or organizations: Not on file     Relationship status: Not on file    Intimate partner violence:     Fear of current or ex partner: Not on file     Emotionally abused: Not on file     Physically abused: Not on file     Forced sexual activity: Not on file   Other Topics Concern    Not on file   Social History Narrative    Not on file         Current Outpatient Medications:     [START ON 7/18/2019] ergocalciferol (VITAMIN D2) 50,000 units, Take 1 capsule (50,000 Units total) by mouth 2 (two) times a week for 54 days, Disp: 16 capsule, Rfl: 0    Etonogestrel (NEXPLANON) 68 MG IMPL, Inject 68 mg under the skin continuous, Disp: , Rfl:     FLUoxetine (PROzac) 10 MG tablet, Take 1 tablet (10 mg total) by mouth daily, Disp: 90 tablet, Rfl: 3    loratadine (CLARITIN) 10 mg tablet, Take 10 mg by mouth daily, Disp: , Rfl:     albuterol (VENTOLIN HFA) 90 mcg/act inhaler, Inhale 2 puffs every 6 (six) hours as needed for wheezing, Disp: 1 each, Rfl: 2    No Known Allergies    Objective   Vitals:    07/17/19 0934   BP: 116/78   BP Location: Left arm   Patient Position: Sitting   Cuff Size: Standard   Weight: 78 5 kg (173 lb)   Height: 5' 6" (1 676 m)     Physical Exam   Constitutional: She appears well-developed and well-nourished  No distress  Neck: No thyromegaly present     Cardiovascular: Normal rate, regular rhythm and normal heart sounds  No murmur heard  Pulmonary/Chest: Effort normal and breath sounds normal  No respiratory distress  She has no wheezes  Right breast exhibits no inverted nipple, no mass, no nipple discharge, no skin change and no tenderness  Left breast exhibits no inverted nipple, no mass, no nipple discharge, no skin change and no tenderness  Breasts are symmetrical    Abdominal: Soft  She exhibits no distension and no mass  There is no tenderness  Genitourinary: Vagina normal and uterus normal  Pelvic exam was performed with patient supine  There is no rash, tenderness or lesion on the right labia  There is no rash, tenderness or lesion on the left labia  Uterus is not deviated, not enlarged, not fixed and not tender  Cervix exhibits no motion tenderness, no discharge and no friability  Right adnexum displays no mass, no tenderness and no fullness  Left adnexum displays no mass, no tenderness and no fullness  No erythema, tenderness or bleeding in the vagina  No foreign body in the vagina  No vaginal discharge found  Musculoskeletal: She exhibits no edema  Lymphadenopathy:     She has no cervical adenopathy  She has no axillary adenopathy  Right: No inguinal adenopathy present  Left: No inguinal adenopathy present  Neurological: She is alert  Skin: Skin is warm  She is not diaphoretic  Psychiatric: She has a normal mood and affect  Her behavior is normal    Vitals reviewed

## 2019-07-19 LAB
LAB AP GYN PRIMARY INTERPRETATION: NORMAL
LAB AP LMP: NORMAL
Lab: NORMAL

## 2019-07-22 ENCOUNTER — TELEPHONE (OUTPATIENT)
Dept: OBGYN CLINIC | Facility: CLINIC | Age: 19
End: 2019-07-22

## 2019-07-22 NOTE — TELEPHONE ENCOUNTER
----- Message from Constantine Rosales sent at 7/18/2019  9:44 AM EDT -----    [facility] for patient to call to schedule her apt     ----- Message -----  From: Cedrick Damon PA-C  Sent: 7/17/2019  10:34 AM EDT  To: [facility]    Please call to schedule her nexplanon removal/reinsertion of new nexplanon

## 2019-08-03 ENCOUNTER — TELEPHONE (OUTPATIENT)
Dept: OTHER | Facility: OTHER | Age: 19
End: 2019-08-03

## 2019-08-03 NOTE — TELEPHONE ENCOUNTER
Antonio Vu Head 2000  CONFIDENTIALTY NOTICE: This fax transmission is intended only for the addressee  It contains information that is legally privileged,  confidential or otherwise protected from use or disclosure  If you are not the intended recipient, you are strictly prohibited from reviewing,  disclosing, copying using or disseminating any of this information or taking any action in reliance on or regarding this information  If you have  received this fax in error, please notify us immediately by telephone so that we can arrange for its return to us  Page:   Call Id: [id number]  Health Call  Standard Call Report  Health Call  Patient Name: Giovani Kern  Gender: Female  : 2000  Age: 22 Y 10 M 15 D  Return Phone  Number: [phone] (Home)  Address: [address] [place] [address]  City/State/Zip: [facility]  Practice Name: [address]  Practice Charged:  Physician:  [facility] Name: Mayrasonny TijerinaKyle  Relationship To  Patient: Mother  Return Phone Number: [phone] (Home)  Presenting Problem: " My daughter needs her emergency  inhaler "  Service Type: Triage  Charged Service 1: N/A  Pharmacy Name and  Number:  Nurse Assessment  Protocols  Protocol Title Nurse Date/Time  Disp  Time Disposition Final User  8/3/2019 4:20:01 PM Send to Follow Up David Isbell RN, [place]  8/3/2019 4:41:16 PM Send to Follow Up David Isbell RN, [facility] [place]  8/3/2019 5:15:59 PM Close Yes Elda Esquivel RN, [facility] [place]  Comments  User: Andria Reece RN Date/Time: 8/3/2019 4:19:54 PM  I called the patient back   no answer  Unable to leave a message due to the mailbox is full  I will try back in 20 minutes  User: Andria Reece RN Date/Time: 8/3/2019 4:41:05 PM  Second attempt to reach patient  No answer and the mailbox is full  I will try again in 30 minutes  User: Andria Reece RN Date/Time: 8/3/2019 5:15:55 PM  Third attempt to reach the patient with no answer  Voice mail box is full   Call closed

## 2019-08-06 NOTE — TELEPHONE ENCOUNTER
Received Rx request for Ventolin inhaler for [facility] store #[id number]  Upon reviewing the patient's chart it looks like the refill was sent in last month on the 16 th  Called primary number on file to speak to the patient but there was no answer and unable to leave a voice mail message because the mailbox was full

## 2019-08-22 ENCOUNTER — TELEPHONE (OUTPATIENT)
Dept: OBGYN CLINIC | Facility: CLINIC | Age: 19
End: 2019-08-22

## 2019-08-23 NOTE — TELEPHONE ENCOUNTER
Called patient back  LMOM with benefit details advised patient to call the office so we can schedule with next menses

## 2019-08-26 ENCOUNTER — PROCEDURE VISIT (OUTPATIENT)
Dept: OBGYN CLINIC | Facility: CLINIC | Age: 19
End: 2019-08-26
Payer: COMMERCIAL

## 2019-08-26 VITALS
BODY MASS INDEX: 28.28 KG/M2 | WEIGHT: 176 LBS | HEIGHT: 66 IN | SYSTOLIC BLOOD PRESSURE: 120 MMHG | DIASTOLIC BLOOD PRESSURE: 90 MMHG

## 2019-08-26 DIAGNOSIS — Z30.017 INSERTION OF NEXPLANON: ICD-10-CM

## 2019-08-26 DIAGNOSIS — Z30.46 ENCOUNTER FOR NEXPLANON REMOVAL: Primary | ICD-10-CM

## 2019-08-26 PROCEDURE — 11983 REMOVE/INSERT DRUG IMPLANT: CPT | Performed by: NURSE PRACTITIONER

## 2019-09-13 DIAGNOSIS — F34.1 DYSTHYMIA: ICD-10-CM

## 2019-09-19 ENCOUNTER — OFFICE VISIT (OUTPATIENT)
Dept: FAMILY MEDICINE CLINIC | Facility: CLINIC | Age: 19
End: 2019-09-19
Payer: COMMERCIAL

## 2019-09-19 VITALS
RESPIRATION RATE: 12 BRPM | BODY MASS INDEX: 27.8 KG/M2 | TEMPERATURE: 98.1 F | HEART RATE: 106 BPM | DIASTOLIC BLOOD PRESSURE: 90 MMHG | WEIGHT: 173 LBS | SYSTOLIC BLOOD PRESSURE: 110 MMHG | HEIGHT: 66 IN | OXYGEN SATURATION: 97 %

## 2019-09-19 DIAGNOSIS — R06.2 WHEEZES: ICD-10-CM

## 2019-09-19 DIAGNOSIS — J45.21 MILD INTERMITTENT ASTHMA WITH ACUTE EXACERBATION: ICD-10-CM

## 2019-09-19 DIAGNOSIS — F34.1 DYSTHYMIA: Primary | ICD-10-CM

## 2019-09-19 PROCEDURE — 3008F BODY MASS INDEX DOCD: CPT | Performed by: NURSE PRACTITIONER

## 2019-09-19 PROCEDURE — 99214 OFFICE O/P EST MOD 30 MIN: CPT | Performed by: NURSE PRACTITIONER

## 2019-09-19 RX ORDER — PREDNISONE 20 MG/1
20 TABLET ORAL DAILY
Qty: 5 TABLET | Refills: 0 | Status: SHIPPED | OUTPATIENT
Start: 2019-09-19 | End: 2021-10-14 | Stop reason: ALTCHOICE

## 2019-09-19 RX ORDER — MONTELUKAST SODIUM 10 MG/1
10 TABLET ORAL
Qty: 30 TABLET | Refills: 5 | Status: SHIPPED | OUTPATIENT
Start: 2019-09-19

## 2019-09-19 RX ORDER — FLUOXETINE 10 MG/1
10 TABLET, FILM COATED ORAL DAILY
Qty: 90 TABLET | Refills: 3 | Status: SHIPPED | OUTPATIENT
Start: 2019-09-19 | End: 2021-02-17 | Stop reason: SDUPTHER

## 2019-09-19 RX ORDER — ALBUTEROL SULFATE 90 UG/1
2 AEROSOL, METERED RESPIRATORY (INHALATION) EVERY 6 HOURS PRN
Qty: 1 EACH | Refills: 2 | Status: SHIPPED | OUTPATIENT
Start: 2019-09-19 | End: 2021-09-08 | Stop reason: SDUPTHER

## 2019-09-19 NOTE — ASSESSMENT & PLAN NOTE
Patient has been using inhaler quite frequently  Will restart Singulair daily at night  Prednisone given for short-term therapy

## 2019-09-19 NOTE — PATIENT INSTRUCTIONS
Prednisone daily for 5 days  Use singulair at night  Increase rest and hydration  Eat small meals to maintain nutrition    Upper Respiratory Infection   WHAT YOU NEED TO KNOW:   An upper respiratory infection is also called the common cold  It is an infection that can affect your nose, throat, ears, and sinuses  For healthy people, the common cold is usually not serious and does not need special treatment  Cold symptoms are usually worst for the first 3 to 5 days  Most people get better in 7 to 14 days  You may continue to cough for 2 to 3 weeks  Colds are caused by viruses and do not get better with antibiotics  DISCHARGE INSTRUCTIONS:   Return to the emergency department if:   · You have chest pain or trouble breathing  Contact your healthcare provider if:   · You have a fever over 102ºF (39°C)  · Your sore throat gets worse or you see white or yellow spots in your throat  · Your symptoms get worse after 3 to 5 days or your cold is not better in 14 days  · You have a rash anywhere on your skin  · You have large, tender lumps in your neck  · You have thick, green or yellow drainage from your nose  · You cough up thick yellow, green, or bloody mucus  · You have vomiting for more than 24 hours and cannot keep fluids down  · You have a bad earache  · You have questions or concerns about your condition or care  Medicines: You may need any of the following:  · Decongestants  help reduce nasal congestion and help you breathe more easily  If you take decongestant pills, they may make you feel restless or cause problems with your sleep  Do not use decongestant sprays for more than a few days  · Cough suppressants  help reduce coughing  Ask your healthcare provider which type of cough medicine is best for you  · NSAIDs , such as ibuprofen, help decrease swelling, pain, and fever  NSAIDs can cause stomach bleeding or kidney problems in certain people   If you take blood thinner medicine, always ask your healthcare provider if NSAIDs are safe for you  Always read the medicine label and follow directions  · Acetaminophen  decreases pain and fever  It is available without a doctor's order  Ask how much to take and how often to take it  Follow directions  Read the labels of all other medicines you are using to see if they also contain acetaminophen, or ask your doctor or pharmacist  Acetaminophen can cause liver damage if not taken correctly  Do not use more than 4 grams (4,000 milligrams) total of acetaminophen in one day  · Take your medicine as directed  Contact your healthcare provider if you think your medicine is not helping or if you have side effects  Tell him or her if you are allergic to any medicine  Keep a list of the medicines, vitamins, and herbs you take  Include the amounts, and when and why you take them  Bring the list or the pill bottles to follow-up visits  Carry your medicine list with you in case of an emergency  Follow up with your healthcare provider as directed:  Write down your questions so you remember to ask them during your visits  Self-care:   · Rest as much as possible  Slowly start to do more each day  · Drink more liquids as directed  Liquids will help thin and loosen mucus so you can cough it up  Liquids will also help prevent dehydration  Liquids that help prevent dehydration include water, fruit juice, and broth  Do not drink liquids that contain caffeine  Caffeine can increase your risk for dehydration  Ask your healthcare provider how much liquid to drink each day  · Soothe a sore throat  Gargle with warm salt water  This helps your sore throat feel better  Make salt water by dissolving ¼ teaspoon salt in 1 cup warm water  You may also suck on hard candy or throat lozenges  You may use a sore throat spray  · Use a humidifier or vaporizer  Use a cool mist humidifier or a vaporizer to increase air moisture in your home   This may make it easier for you to breathe and help decrease your cough  · Use saline nasal drops as directed  These help relieve congestion  · Apply petroleum-based jelly around the outside of your nostrils  This can decrease irritation from blowing your nose  · Do not smoke  Nicotine and other chemicals in cigarettes and cigars can make your symptoms worse  They can also cause infections such as bronchitis or pneumonia  Ask your healthcare provider for information if you currently smoke and need help to quit  E-cigarettes or smokeless tobacco still contain nicotine  Talk to your healthcare provider before you use these products  Prevent spreading your cold to others:   · Try to stay away from other people during the first 2 to 3 days of your cold when it is more easily spread  · Do not share food or drinks  · Do not share hand towels with household members  · Wash your hands often, especially after you blow your nose  Turn away from other people and cover your mouth and nose with a tissue when you sneeze or cough  © 2017 [facility] Information is for End User's use only and may not be sold, redistributed or otherwise used for commercial purposes  All illustrations and images included in CareNotes® are the copyrighted property of [employer] , [employer]  or Manuel Santiago  The above information is an  only  It is not intended as medical advice for individual conditions or treatments  Talk to your doctor, nurse or pharmacist before following any medical regimen to see if it is safe and effective for you

## 2019-09-19 NOTE — PROGRESS NOTES
Assessment/Plan:    Dysthymia  Continue Prozac  Maintain good nutrition, rest, exercise, hydration  Mild intermittent asthma with acute exacerbation  Patient has been using inhaler quite frequently  Will restart Singulair daily at night  Prednisone given for short-term therapy  Wheezes  Prednisone ordered  Problem List Items Addressed This Visit        Respiratory    Mild intermittent asthma with acute exacerbation     Patient has been using inhaler quite frequently  Will restart Singulair daily at night  Prednisone given for short-term therapy  Relevant Medications    albuterol (VENTOLIN HFA) 90 mcg/act inhaler    montelukast (SINGULAIR) 10 mg tablet       Other    Dysthymia - Primary     Continue Prozac  Maintain good nutrition, rest, exercise, hydration  Relevant Medications    FLUoxetine (PROzac) 10 MG tablet    Wheezes     Prednisone ordered  Relevant Medications    montelukast (SINGULAIR) 10 mg tablet    predniSONE 20 mg tablet            Subjective:      Patient ID: Mitul García is a 22 y o  female  Patient for follow-up the patient is doing well  States that the Prozac is working well continues school and  work  States that she does have little bit of respiratory congestion  Not been taking any over-the-counter medication  She did have her Pap smear done  It was normal       The following portions of the patient's history were reviewed and updated as appropriate: allergies, current medications, past family history, past medical history, past social history, past surgical history and problem list     Review of Systems   Constitutional: Positive for fatigue and fever  HENT: Positive for sinus pressure, sinus pain and sore throat  Eyes: Negative  Respiratory: Positive for shortness of breath  Cardiovascular: Negative  Gastrointestinal: Negative  Endocrine: Negative  Genitourinary: Negative  Musculoskeletal: Negative  Skin: Negative  Allergic/Immunologic: Negative  Neurological: Negative  Psychiatric/Behavioral: Positive for dysphoric mood  Objective:      /90   Pulse (!) 106   Temp 98 1 °F (36 7 °C)   Resp 12   Ht 5' 6" (1 676 m)   Wt 78 5 kg (173 lb)   LMP 08/21/2019   SpO2 97%   BMI 27 92 kg/m²          Physical Exam   Constitutional: She is oriented to person, place, and time  She appears well-developed and well-nourished  HENT:   Head: Normocephalic and atraumatic  Right Ear: External ear normal    Left Ear: External ear normal    Nose: Sinus tenderness present  Right sinus exhibits maxillary sinus tenderness and frontal sinus tenderness  Left sinus exhibits maxillary sinus tenderness and frontal sinus tenderness  Eyes: Pupils are equal, round, and reactive to light  Neck: Normal range of motion  Cardiovascular: Normal rate and regular rhythm  Pulmonary/Chest: Effort normal    Abdominal: Soft  Bowel sounds are normal    Musculoskeletal: Normal range of motion  Neurological: She is alert and oriented to person, place, and time  Skin: Skin is warm and dry  Psychiatric: She has a normal mood and affect  Nursing note and vitals reviewed          Labs:    Lab Results   Component Value Date    WBC 7 00 05/07/2019    HGB 14 3 05/07/2019    HCT 42 9 05/07/2019    MCV 92 05/07/2019     05/07/2019     Lab Results   Component Value Date    K 4 2 05/07/2019     05/07/2019    CO2 26 05/07/2019    BUN 11 05/07/2019    CREATININE 0 70 05/07/2019    GLUF 93 05/07/2019    CALCIUM 9 2 05/07/2019    AST 13 05/07/2019    ALT 35 05/07/2019    ALKPHOS 56 05/07/2019    EGFR 124 05/07/2019     Lab Results   Component Value Date    CALCIUM 9 2 05/07/2019    K 4 2 05/07/2019    CO2 26 05/07/2019     05/07/2019    BUN 11 05/07/2019    CREATININE 0 70 05/07/2019

## 2019-10-16 RX ORDER — FLUOXETINE 10 MG/1
TABLET, FILM COATED ORAL
Qty: 30 TABLET | Refills: 3 | Status: SHIPPED | OUTPATIENT
Start: 2019-10-16 | End: 2021-05-20 | Stop reason: SDUPTHER

## 2020-03-21 ENCOUNTER — TELEPHONE (OUTPATIENT)
Dept: OTHER | Facility: OTHER | Age: 20
End: 2020-03-21

## 2020-03-21 ENCOUNTER — NURSE TRIAGE (OUTPATIENT)
Dept: OTHER | Facility: OTHER | Age: 20
End: 2020-03-21

## 2020-03-21 DIAGNOSIS — J45.909 UNCOMPLICATED ASTHMA, UNSPECIFIED ASTHMA SEVERITY, UNSPECIFIED WHETHER PERSISTENT: Primary | ICD-10-CM

## 2020-03-21 RX ORDER — ALBUTEROL SULFATE 2.5 MG/3ML
2.5 SOLUTION RESPIRATORY (INHALATION) EVERY 6 HOURS PRN
Qty: 60 VIAL | Refills: 3 | Status: SHIPPED | OUTPATIENT
Start: 2020-03-21 | End: 2020-03-23 | Stop reason: SDUPTHER

## 2020-03-21 NOTE — TELEPHONE ENCOUNTER
Dr Jacob Sagluero was paged via [facility] @ [phone] working remotely/ Glenys cuaQea nebulizer was ordered by Chrissy Earl, question about it

## 2020-03-21 NOTE — TELEPHONE ENCOUNTER
Dr Sienna Hardy returned the call  She said that the medical assistants usually fax the orders for durable medical equipment to a medical supply company and that company reaches out to the family regarding the equipment  or delivery  She said she will speak with the ordering physician to see if it was faxed and call me back

## 2020-03-21 NOTE — TELEPHONE ENCOUNTER
I called father and relayed that patient's mother was given the order for a nebulizer machine and will be going to a medical supply company to get the nebulizer  Advised to call back if further assistance is needed

## 2020-03-21 NOTE — TELEPHONE ENCOUNTER
Dr Keyon Anne called back and said that patient's mother was provided with the order for the nebulizer machine and she will be taking it to a medical supply company  Mother works with Dr Maryam Franco

## 2020-03-21 NOTE — TELEPHONE ENCOUNTER
Reason for Disposition   [1] Request for URGENT new prescription or refill of "essential" medication (i e , likelihood of harm to patient if not taken) AND [2] triager unable to fill per unit policy    Answer Assessment - Initial Assessment Questions  1  SYMPTOMS: "Do you have any symptoms?"      Intermittent wheezing  2  SEVERITY: If symptoms are present, ask "Are they mild, moderate or severe?"      Father is not with patient and doesn't know how severe the symptoms are  Patient is using her rescue inhaler as prescribed  Father is calling because the patient was prescribed albuterol for nebulization, but they do not have the nebulizer machine      Protocols used: MEDICATION QUESTION CALL-ADULT-

## 2020-03-21 NOTE — TELEPHONE ENCOUNTER
Regarding: Asthmatic Symptoms  ----- Message from Cruz Dinh sent at 3/21/2020  1:44 PM EDT -----  "My daughter has asthma and is in need of a nebulizer   She is experiencing some wheezing "

## 2020-03-21 NOTE — PROGRESS NOTES
Patient to establish care in pulm office  Has known history of asthma  Has broken nebulizer machine  Needs meds

## 2020-03-23 ENCOUNTER — TELEPHONE (OUTPATIENT)
Dept: FAMILY MEDICINE CLINIC | Facility: CLINIC | Age: 20
End: 2020-03-23

## 2020-03-23 DIAGNOSIS — J45.909 UNCOMPLICATED ASTHMA, UNSPECIFIED ASTHMA SEVERITY, UNSPECIFIED WHETHER PERSISTENT: ICD-10-CM

## 2020-03-23 DIAGNOSIS — R06.2 WHEEZES: Primary | ICD-10-CM

## 2020-03-23 RX ORDER — ALBUTEROL SULFATE 2.5 MG/3ML
2.5 SOLUTION RESPIRATORY (INHALATION) EVERY 6 HOURS PRN
Qty: 60 VIAL | Refills: 3 | Status: SHIPPED | OUTPATIENT
Start: 2020-03-23 | End: 2021-10-18 | Stop reason: SDUPTHER

## 2020-03-23 NOTE — TELEPHONE ENCOUNTER
Pt  Called requesting for new order for nebulizer  Asthma is full swing  How quick can she get a new one   Kasia's pt

## 2020-03-23 NOTE — PROGRESS NOTES
Mom called pt needs the neb machine  Ordered and faxed to luiza       Medication was sent in by not the  Machine  ( spoke to Drea Callaway )     Ordered and faxed

## 2020-03-27 ENCOUNTER — TELEPHONE (OUTPATIENT)
Dept: FAMILY MEDICINE CLINIC | Facility: CLINIC | Age: 20
End: 2020-03-27

## 2020-03-27 NOTE — TELEPHONE ENCOUNTER
She needs a letter stating what medical condition she has so she can show it to work and back her up to take some time off

## 2020-03-27 NOTE — TELEPHONE ENCOUNTER
She asked for a work note  I made one saying she is seen in the office   Can you print out her problem list

## 2020-03-27 NOTE — TELEPHONE ENCOUNTER
I saw pt last sept, doesn't not have any condition that would require time off from work? Is there something new? Please schedule a virtual video appt if necessary  Thanks

## 2020-09-08 ENCOUNTER — TELEPHONE (OUTPATIENT)
Dept: FAMILY MEDICINE CLINIC | Facility: CLINIC | Age: 20
End: 2020-09-08

## 2020-09-08 NOTE — TELEPHONE ENCOUNTER
Pt called requesting order for chest xray put in per pulmonology , Roberta Jordan states pulmonology should place xray since she is unsure what is being checked, she states pt can set up appt with her if needed further evaluation before order is placed

## 2020-09-14 NOTE — TELEPHONE ENCOUNTER
She needs an order placed for an x-ray due to asthma  The pulmonologist is seeing her for her asthma  Mom said you are supposed to put it in because you are her primary  She did not want to schedule an apt in the office  She said if you are not willing to place the order let her know because pulmonology will order it  She asked that you contact her soon, she does have tiger text  Afua Salcedo has an apt with Pulmonology on 09/23 and needs the x-ray done before that

## 2020-09-15 DIAGNOSIS — J45.909 MODERATE ASTHMA WITHOUT COMPLICATION, UNSPECIFIED WHETHER PERSISTENT: Primary | ICD-10-CM

## 2020-09-18 ENCOUNTER — HOSPITAL ENCOUNTER (OUTPATIENT)
Dept: RADIOLOGY | Facility: HOSPITAL | Age: 20
Discharge: HOME | End: 2020-09-18
Payer: COMMERCIAL

## 2020-09-18 DIAGNOSIS — J45.909 MODERATE ASTHMA WITHOUT COMPLICATION, UNSPECIFIED WHETHER PERSISTENT: ICD-10-CM

## 2020-09-18 PROCEDURE — 71046 X-RAY EXAM CHEST 2 VIEWS: CPT

## 2020-12-29 ENCOUNTER — TELEMEDICINE (OUTPATIENT)
Dept: FAMILY MEDICINE CLINIC | Facility: CLINIC | Age: 20
End: 2020-12-29
Payer: COMMERCIAL

## 2020-12-29 VITALS — HEIGHT: 66 IN | BODY MASS INDEX: 27.32 KG/M2 | WEIGHT: 170 LBS

## 2020-12-29 DIAGNOSIS — Z20.822 CLOSE EXPOSURE TO COVID-19 VIRUS: Primary | ICD-10-CM

## 2020-12-29 DIAGNOSIS — R68.89 FLU-LIKE SYMPTOMS: ICD-10-CM

## 2020-12-29 PROCEDURE — 99213 OFFICE O/P EST LOW 20 MIN: CPT | Performed by: NURSE PRACTITIONER

## 2020-12-30 PROCEDURE — U0003 INFECTIOUS AGENT DETECTION BY NUCLEIC ACID (DNA OR RNA); SEVERE ACUTE RESPIRATORY SYNDROME CORONAVIRUS 2 (SARS-COV-2) (CORONAVIRUS DISEASE [COVID-19]), AMPLIFIED PROBE TECHNIQUE, MAKING USE OF HIGH THROUGHPUT TECHNOLOGIES AS DESCRIBED BY CMS-2020-01-R: HCPCS | Performed by: NURSE PRACTITIONER

## 2021-01-01 LAB — SARS-COV-2 RNA SPEC QL NAA+PROBE: NOT DETECTED

## 2021-02-17 DIAGNOSIS — F34.1 DYSTHYMIA: ICD-10-CM

## 2021-02-17 RX ORDER — FLUOXETINE 10 MG/1
10 TABLET, FILM COATED ORAL DAILY
Qty: 30 TABLET | Refills: 0 | Status: SHIPPED | OUTPATIENT
Start: 2021-02-17 | End: 2021-03-15

## 2021-02-17 NOTE — TELEPHONE ENCOUNTER
Called and left a message with patient letting her know she has been given a courtesy refill and will need to set up a f/u chuck   Request to call office back

## 2021-03-13 DIAGNOSIS — F34.1 DYSTHYMIA: ICD-10-CM

## 2021-03-15 RX ORDER — FLUOXETINE 10 MG/1
TABLET, FILM COATED ORAL
Qty: 30 TABLET | Refills: 0 | Status: SHIPPED | OUTPATIENT
Start: 2021-03-15 | End: 2021-07-09

## 2021-04-15 ENCOUNTER — TELEMEDICINE (OUTPATIENT)
Dept: FAMILY MEDICINE CLINIC | Facility: CLINIC | Age: 21
End: 2021-04-15
Payer: COMMERCIAL

## 2021-04-15 VITALS — WEIGHT: 173 LBS | BODY MASS INDEX: 27.8 KG/M2 | HEIGHT: 66 IN | TEMPERATURE: 98.2 F

## 2021-04-15 DIAGNOSIS — Z20.822 CLOSE EXPOSURE TO COVID-19 VIRUS: ICD-10-CM

## 2021-04-15 DIAGNOSIS — R68.89 FLU-LIKE SYMPTOMS: Primary | ICD-10-CM

## 2021-04-15 PROCEDURE — 99213 OFFICE O/P EST LOW 20 MIN: CPT | Performed by: NURSE PRACTITIONER

## 2021-04-15 PROCEDURE — U0005 INFEC AGEN DETEC AMPLI PROBE: HCPCS | Performed by: NURSE PRACTITIONER

## 2021-04-15 PROCEDURE — U0003 INFECTIOUS AGENT DETECTION BY NUCLEIC ACID (DNA OR RNA); SEVERE ACUTE RESPIRATORY SYNDROME CORONAVIRUS 2 (SARS-COV-2) (CORONAVIRUS DISEASE [COVID-19]), AMPLIFIED PROBE TECHNIQUE, MAKING USE OF HIGH THROUGHPUT TECHNOLOGIES AS DESCRIBED BY CMS-2020-01-R: HCPCS | Performed by: NURSE PRACTITIONER

## 2021-04-15 NOTE — PROGRESS NOTES
Virtual Regular Visit      Assessment/Plan:    Problem List Items Addressed This Visit        Other    Close exposure to COVID-19 virus    Relevant Orders    Novel Coronavirus (Covid-19),PCR [facility] - Collected in Office    Flu-like symptoms - Primary      Patient had a positive exposure to  COVID-19 virus  Last Tuesday  Reports not wearing a mask, they were out side had more than 2 hours of contact  Has some diarrhea nausea headache congestion  Indication for testing  Discussed management of symptoms  Increase fluid hydration rest nutrition  Maintain quarantine  Note given for work  Advised to call office if symptoms are increased  Relevant Orders    Novel Coronavirus (Covid-19),PCR [facility] - Collected in Office               Reason for visit is   Chief Complaint   Patient presents with    Virtual Regular Visit        Encounter provider NOEL Dawn    Provider located at [address] [place] [id number] [phone]      Recent Visits  No visits were found meeting these conditions  Showing recent visits within past 7 days and meeting all other requirements     Today's Visits  Date Type Provider Dept   04/15/21 Telemedicine Kasia Ferraro [address] today's visits and meeting all other requirements     Future Appointments  No visits were found meeting these conditions  Showing future appointments within next 150 days and meeting all other requirements        The patient was identified by name and date of birth  Bozena Alexis was informed that this is a telemedicine visit and that the visit is being conducted through [employer] and patient was informed that this is not a secure, HIPAA-compliant platform  She agrees to proceed     My office door was closed  No one else was in the room    She acknowledged consent and understanding of privacy and security of the video platform  The patient has agreed to participate and understands they can discontinue the visit at any time  Patient is aware this is a billable service  Subjective  Venora Dandy is a 19 y o  female      patient is being seen for virtual visit with complaints of close COVID exposure  Reports that she was at a group over friends last week Tuesday got news yesterday that her friend tested positive  They were outside in an open area, more than 2 hours of contact, not wearing masks  Patient does have some diarrhea nausea congestion headache denies any fevers or chills  Denies any appetite change       Past Medical History:   Diagnosis Date    Asthma     Dysthymia 5/15/2019       Past Surgical History:   Procedure Laterality Date    NO PAST SURGERIES         Current Outpatient Medications   Medication Sig Dispense Refill    albuterol (2 5 mg/3 mL) 0 083 % nebulizer solution Take 1 vial (2 5 mg total) by nebulization every 6 (six) hours as needed for wheezing or shortness of breath 60 vial 3    albuterol (VENTOLIN HFA) 90 mcg/act inhaler Inhale 2 puffs every 6 (six) hours as needed for wheezing 1 each 2    Etonogestrel (NEXPLANON) 68 MG IMPL Inject 68 mg under the skin continuous      FLUoxetine (PROzac) 10 MG tablet TAKE 1 TABLET BY MOUTH EVERY DAY 30 tablet 3    FLUoxetine (PROzac) 10 MG tablet TAKE 1 TABLET BY MOUTH EVERY DAY 30 tablet 0    loratadine (CLARITIN) 10 mg tablet Take 10 mg by mouth daily      montelukast (SINGULAIR) 10 mg tablet Take 1 tablet (10 mg total) by mouth daily at bedtime 30 tablet 5    predniSONE 20 mg tablet Take 1 tablet (20 mg total) by mouth daily 5 tablet 0     No current facility-administered medications for this visit  No Known Allergies    Review of Systems   Constitutional: Positive for fatigue  Negative for chills and diaphoresis  HENT: Positive for congestion  Eyes: Negative  Respiratory: Negative  Cardiovascular: Negative  Gastrointestinal: Positive for diarrhea (2 days ago) and nausea  Endocrine: Negative  Genitourinary: Negative  Musculoskeletal: Negative  Skin: Negative  Allergic/Immunologic: Negative  Neurological: Negative  Negative for headaches  Psychiatric/Behavioral: Negative  Video Exam    Vitals:    04/15/21 1418   Temp: 98 2 °F (36 8 °C)   Weight: 78 5 kg (173 lb)   Height: 5' 6" (1 676 m)       Physical Exam  Constitutional:       Appearance: She is well-developed  HENT:      Head: Normocephalic and atraumatic  Neck:      Musculoskeletal: Normal range of motion and neck supple  Pulmonary:      Effort: Pulmonary effort is normal    Musculoskeletal: Normal range of motion  Skin:     General: Skin is dry  Neurological:      Mental Status: She is alert and oriented to person, place, and time  Psychiatric:         Behavior: Behavior normal          Thought Content: Thought content normal          Judgment: Judgment normal           I spent 15 minutes directly with the patient during this visit      [address] [place] [address] acknowledges that she has consented to an online visit or consultation  She understands that the online visit is based solely on information provided by her, and that, in the absence of a face-to-face physical evaluation by the physician, the diagnosis she receives is both limited and provisional in terms of accuracy and completeness  This is not intended to replace a full medical face-to-face evaluation by the physician  [facility] understands and accepts these terms

## 2021-04-15 NOTE — ASSESSMENT & PLAN NOTE
Patient had a positive exposure to  COVID-19 virus  Last Tuesday  Reports not wearing a mask, they were out side had more than 2 hours of contact  Has some diarrhea nausea headache congestion  Indication for testing  Discussed management of symptoms  Increase fluid hydration rest nutrition  Maintain quarantine  Note given for work  Advised to call office if symptoms are increased

## 2021-04-16 LAB — SARS-COV-2 RNA RESP QL NAA+PROBE: NEGATIVE

## 2021-05-20 DIAGNOSIS — F34.1 DYSTHYMIA: ICD-10-CM

## 2021-05-20 RX ORDER — FLUOXETINE 10 MG/1
10 TABLET, FILM COATED ORAL DAILY
Qty: 30 TABLET | Refills: 0 | Status: SHIPPED | OUTPATIENT
Start: 2021-05-20 | End: 2021-07-09

## 2021-07-09 DIAGNOSIS — F34.1 DYSTHYMIA: ICD-10-CM

## 2021-07-09 RX ORDER — FLUOXETINE 10 MG/1
10 TABLET, FILM COATED ORAL DAILY
Qty: 90 TABLET | Refills: 0 | Status: SHIPPED | OUTPATIENT
Start: 2021-07-09 | End: 2021-09-08 | Stop reason: SDUPTHER

## 2021-08-27 ENCOUNTER — TELEMEDICINE (OUTPATIENT)
Dept: FAMILY MEDICINE CLINIC | Facility: CLINIC | Age: 21
End: 2021-08-27
Payer: COMMERCIAL

## 2021-08-27 DIAGNOSIS — Z20.822 CLOSE EXPOSURE TO COVID-19 VIRUS: Primary | ICD-10-CM

## 2021-08-27 DIAGNOSIS — J45.21 MILD INTERMITTENT ASTHMA WITH ACUTE EXACERBATION: ICD-10-CM

## 2021-08-27 PROCEDURE — U0005 INFEC AGEN DETEC AMPLI PROBE: HCPCS | Performed by: FAMILY MEDICINE

## 2021-08-27 PROCEDURE — U0003 INFECTIOUS AGENT DETECTION BY NUCLEIC ACID (DNA OR RNA); SEVERE ACUTE RESPIRATORY SYNDROME CORONAVIRUS 2 (SARS-COV-2) (CORONAVIRUS DISEASE [COVID-19]), AMPLIFIED PROBE TECHNIQUE, MAKING USE OF HIGH THROUGHPUT TECHNOLOGIES AS DESCRIBED BY CMS-2020-01-R: HCPCS | Performed by: FAMILY MEDICINE

## 2021-08-27 PROCEDURE — 99213 OFFICE O/P EST LOW 20 MIN: CPT | Performed by: FAMILY MEDICINE

## 2021-08-27 NOTE — LETTER
August 27, 2021     Patient: Katie Mayers   YOB: 2000   Date of Visit: 8/27/2021       To Whom it May Concern:    Katie Mayers is under my professional care  She was seen virtually on 8/27/2021  She may return to work pending negative COVID-19 results  She is to be quarantined until results are finalized  If you have any questions or concerns, please don't hesitate to call           Sincerely,          Silverio Kenney MD        CC: Katie Mayers

## 2021-08-27 NOTE — PROGRESS NOTES
Virtual Regular Visit    Verification of patient location:    Patient is located in the following state in which I hold an active license [place]      Assessment/Plan:    Problem List Items Addressed This Visit        Respiratory    Mild intermittent asthma with acute exacerbation    Relevant Orders    Novel Coronavirus (COVID-19), PCR [facility] Collected in Office       Other    Close exposure to COVID-19 virus - Primary    Relevant Orders    Novel Coronavirus (COVID-19), PCR [facility] Collected in Office               Reason for visit is   Chief Complaint   Patient presents with    Virtual Regular Visit        Encounter provider Elio Bhatia MD    Provider located at [address] [place] [address] [address] [facility]  [address] [place] [id number] [phone]      Recent Visits  No visits were found meeting these conditions  Showing recent visits within past 7 days and meeting all other requirements  Today's Visits  Date Type Provider Dept   08/27/21 Telemedicine Elio Bhatia MD [address] today's visits and meeting all other requirements  Future Appointments  No visits were found meeting these conditions  Showing future appointments within next 150 days and meeting all other requirements       The patient was identified by name and date of birth  Chacha Giron was informed that this is a telemedicine visit and that the visit is being conducted through [address] [place] [address] Now and patient was informed that this is a secure, HIPAA-compliant platform  She agrees to proceed     My office door was closed  No one else was in the room  She acknowledged consent and understanding of privacy and security of the video platform  The patient has agreed to participate and understands they can discontinue the visit at any time  Patient is aware this is a billable service       Subjective  Chacha Giron is a 19 y o  female she has been exposed to COVID-19 infected people twice in the last 2 weeks  She has mild exacerbation of her asthma  She is concerned that she may have contracted COVID-19 infection  Manjeet Rylee HPI     Past Medical History:   Diagnosis Date    Asthma     Dysthymia 5/15/2019       Past Surgical History:   Procedure Laterality Date    NO PAST SURGERIES         Current Outpatient Medications   Medication Sig Dispense Refill    albuterol (2 5 mg/3 mL) 0 083 % nebulizer solution Take 1 vial (2 5 mg total) by nebulization every 6 (six) hours as needed for wheezing or shortness of breath 60 vial 3    albuterol (VENTOLIN HFA) 90 mcg/act inhaler Inhale 2 puffs every 6 (six) hours as needed for wheezing 1 each 2    Etonogestrel (NEXPLANON) 68 MG IMPL Inject 68 mg under the skin continuous      FLUoxetine (PROzac) 10 MG tablet Take 1 tablet (10 mg total) by mouth daily 90 tablet 0    loratadine (CLARITIN) 10 mg tablet Take 10 mg by mouth daily      montelukast (SINGULAIR) 10 mg tablet Take 1 tablet (10 mg total) by mouth daily at bedtime 30 tablet 5    predniSONE 20 mg tablet Take 1 tablet (20 mg total) by mouth daily 5 tablet 0     No current facility-administered medications for this visit  No Known Allergies    Review of Systems   Constitutional: Negative  HENT: Negative  Respiratory: Positive for shortness of breath and wheezing  Gastrointestinal: Negative  Video Exam    There were no vitals filed for this visit  Physical Exam  Constitutional:       Appearance: Normal appearance  HENT:      Head: Normocephalic and atraumatic  Pulmonary:      Effort: Pulmonary effort is normal    Neurological:      Mental Status: She is alert  Psychiatric:         Mood and Affect: Mood normal          Behavior: Behavior normal           I spent 15 minutes directly with the patient during this visit    [id number] [address] verbally agrees to participate in [facility]   Pt is aware that [facility] could be limited without vital signs or the ability to perform a full hands-on physical exam  Gauri Head understands she or the provider may request at any time to terminate the video visit and request the patient to seek care or treatment in person

## 2021-08-28 LAB — SARS-COV-2 RNA RESP QL NAA+PROBE: NEGATIVE

## 2021-09-08 DIAGNOSIS — F34.1 DYSTHYMIA: ICD-10-CM

## 2021-09-08 DIAGNOSIS — J45.21 MILD INTERMITTENT ASTHMA WITH ACUTE EXACERBATION: ICD-10-CM

## 2021-09-08 RX ORDER — ALBUTEROL SULFATE 90 UG/1
2 AEROSOL, METERED RESPIRATORY (INHALATION) EVERY 6 HOURS PRN
Qty: 18 G | Refills: 1 | Status: SHIPPED | OUTPATIENT
Start: 2021-09-08

## 2021-09-08 RX ORDER — FLUOXETINE 10 MG/1
10 TABLET, FILM COATED ORAL DAILY
Qty: 90 TABLET | Refills: 0 | Status: SHIPPED | OUTPATIENT
Start: 2021-09-08 | End: 2021-10-18 | Stop reason: SDUPTHER

## 2021-10-14 ENCOUNTER — TELEMEDICINE (OUTPATIENT)
Dept: FAMILY MEDICINE CLINIC | Facility: CLINIC | Age: 21
End: 2021-10-14
Payer: COMMERCIAL

## 2021-10-14 DIAGNOSIS — B34.9 VIRAL INFECTION, UNSPECIFIED: ICD-10-CM

## 2021-10-14 DIAGNOSIS — Z20.822 EXPOSURE TO COVID-19 VIRUS: ICD-10-CM

## 2021-10-14 PROCEDURE — 99213 OFFICE O/P EST LOW 20 MIN: CPT | Performed by: NURSE PRACTITIONER

## 2021-10-14 RX ORDER — GUAIFENESIN 600 MG
600 TABLET, EXTENDED RELEASE 12 HR ORAL EVERY 12 HOURS SCHEDULED
Qty: 60 TABLET | Refills: 0 | Status: SHIPPED | OUTPATIENT
Start: 2021-10-14

## 2021-10-14 RX ORDER — ACETAMINOPHEN 160 MG
2000 TABLET,DISINTEGRATING ORAL DAILY
Qty: 30 CAPSULE | Refills: 0 | Status: SHIPPED | OUTPATIENT
Start: 2021-10-14 | End: 2021-11-08

## 2021-10-15 PROCEDURE — U0003 INFECTIOUS AGENT DETECTION BY NUCLEIC ACID (DNA OR RNA); SEVERE ACUTE RESPIRATORY SYNDROME CORONAVIRUS 2 (SARS-COV-2) (CORONAVIRUS DISEASE [COVID-19]), AMPLIFIED PROBE TECHNIQUE, MAKING USE OF HIGH THROUGHPUT TECHNOLOGIES AS DESCRIBED BY CMS-2020-01-R: HCPCS | Performed by: NURSE PRACTITIONER

## 2021-10-15 PROCEDURE — U0005 INFEC AGEN DETEC AMPLI PROBE: HCPCS | Performed by: NURSE PRACTITIONER

## 2021-10-16 LAB — SARS-COV-2 RNA RESP QL NAA+PROBE: NEGATIVE

## 2021-10-18 DIAGNOSIS — J45.909 UNCOMPLICATED ASTHMA, UNSPECIFIED ASTHMA SEVERITY, UNSPECIFIED WHETHER PERSISTENT: ICD-10-CM

## 2021-10-18 DIAGNOSIS — F34.1 DYSTHYMIA: ICD-10-CM

## 2021-10-18 RX ORDER — FLUOXETINE 10 MG/1
10 TABLET, FILM COATED ORAL DAILY
Qty: 90 TABLET | Refills: 0 | Status: SHIPPED | OUTPATIENT
Start: 2021-10-18 | End: 2021-10-29 | Stop reason: SDUPTHER

## 2021-10-18 RX ORDER — ALBUTEROL SULFATE 2.5 MG/3ML
2.5 SOLUTION RESPIRATORY (INHALATION) EVERY 6 HOURS PRN
Qty: 60 ML | Refills: 0 | Status: SHIPPED | OUTPATIENT
Start: 2021-10-18 | End: 2021-11-17

## 2021-10-21 ENCOUNTER — TELEMEDICINE (OUTPATIENT)
Dept: FAMILY MEDICINE CLINIC | Facility: CLINIC | Age: 21
End: 2021-10-21
Payer: COMMERCIAL

## 2021-10-21 DIAGNOSIS — Z20.822 EXPOSURE TO COVID-19 VIRUS: ICD-10-CM

## 2021-10-21 DIAGNOSIS — B34.9 VIRAL INFECTION, UNSPECIFIED: ICD-10-CM

## 2021-10-21 DIAGNOSIS — J45.21 MILD INTERMITTENT ASTHMA WITH ACUTE EXACERBATION: Primary | ICD-10-CM

## 2021-10-21 PROCEDURE — 99213 OFFICE O/P EST LOW 20 MIN: CPT | Performed by: NURSE PRACTITIONER

## 2021-10-21 PROCEDURE — U0003 INFECTIOUS AGENT DETECTION BY NUCLEIC ACID (DNA OR RNA); SEVERE ACUTE RESPIRATORY SYNDROME CORONAVIRUS 2 (SARS-COV-2) (CORONAVIRUS DISEASE [COVID-19]), AMPLIFIED PROBE TECHNIQUE, MAKING USE OF HIGH THROUGHPUT TECHNOLOGIES AS DESCRIBED BY CMS-2020-01-R: HCPCS | Performed by: NURSE PRACTITIONER

## 2021-10-21 PROCEDURE — U0005 INFEC AGEN DETEC AMPLI PROBE: HCPCS | Performed by: NURSE PRACTITIONER

## 2021-10-21 RX ORDER — PREDNISONE 20 MG/1
40 TABLET ORAL DAILY
Qty: 10 TABLET | Refills: 0 | Status: SHIPPED | OUTPATIENT
Start: 2021-10-21 | End: 2021-10-26

## 2021-10-21 RX ORDER — AZITHROMYCIN 250 MG/1
TABLET, FILM COATED ORAL
Qty: 6 TABLET | Refills: 0 | Status: SHIPPED | OUTPATIENT
Start: 2021-10-21 | End: 2021-10-26

## 2021-10-22 LAB — SARS-COV-2 RNA RESP QL NAA+PROBE: NEGATIVE

## 2021-10-29 DIAGNOSIS — F34.1 DYSTHYMIA: ICD-10-CM

## 2021-10-29 RX ORDER — FLUOXETINE 10 MG/1
10 TABLET, FILM COATED ORAL DAILY
Qty: 90 TABLET | Refills: 0 | Status: SHIPPED | OUTPATIENT
Start: 2021-10-29 | End: 2022-01-31

## 2021-11-08 DIAGNOSIS — B34.9 VIRAL INFECTION, UNSPECIFIED: ICD-10-CM

## 2021-11-08 DIAGNOSIS — Z20.822 EXPOSURE TO COVID-19 VIRUS: ICD-10-CM

## 2021-11-08 RX ORDER — ACETAMINOPHEN 160 MG
TABLET,DISINTEGRATING ORAL
Qty: 30 CAPSULE | Refills: 0 | Status: SHIPPED | OUTPATIENT
Start: 2021-11-08

## 2022-01-31 DIAGNOSIS — F34.1 DYSTHYMIA: ICD-10-CM

## 2022-01-31 RX ORDER — FLUOXETINE 10 MG/1
TABLET, FILM COATED ORAL
Qty: 30 TABLET | Refills: 2 | Status: SHIPPED | OUTPATIENT
Start: 2022-01-31 | End: 2022-03-03

## 2022-03-03 DIAGNOSIS — F34.1 DYSTHYMIA: ICD-10-CM

## 2022-03-03 RX ORDER — FLUOXETINE 10 MG/1
TABLET, FILM COATED ORAL
Qty: 90 TABLET | Refills: 1 | Status: SHIPPED | OUTPATIENT
Start: 2022-03-03

## 2022-07-27 ENCOUNTER — TELEPHONE (OUTPATIENT)
Dept: FAMILY MEDICINE CLINIC | Facility: CLINIC | Age: 22
End: 2022-07-27

## 2022-08-26 ENCOUNTER — OFFICE VISIT (OUTPATIENT)
Dept: FAMILY MEDICINE CLINIC | Facility: CLINIC | Age: 22
End: 2022-08-26
Payer: COMMERCIAL

## 2022-08-26 VITALS
DIASTOLIC BLOOD PRESSURE: 76 MMHG | SYSTOLIC BLOOD PRESSURE: 116 MMHG | TEMPERATURE: 98.2 F | HEART RATE: 101 BPM | HEIGHT: 66 IN | WEIGHT: 180 LBS | OXYGEN SATURATION: 98 % | BODY MASS INDEX: 28.93 KG/M2

## 2022-08-26 DIAGNOSIS — Z12.4 CERVICAL CANCER SCREENING: Primary | ICD-10-CM

## 2022-08-26 DIAGNOSIS — F34.1 DYSTHYMIA: ICD-10-CM

## 2022-08-26 DIAGNOSIS — K21.00 GASTROESOPHAGEAL REFLUX DISEASE WITH ESOPHAGITIS WITHOUT HEMORRHAGE: ICD-10-CM

## 2022-08-26 DIAGNOSIS — Z11.3 SCREENING FOR STDS (SEXUALLY TRANSMITTED DISEASES): ICD-10-CM

## 2022-08-26 DIAGNOSIS — Z11.59 NEED FOR HEPATITIS C SCREENING TEST: ICD-10-CM

## 2022-08-26 DIAGNOSIS — Z11.4 SCREENING FOR HIV (HUMAN IMMUNODEFICIENCY VIRUS): ICD-10-CM

## 2022-08-26 DIAGNOSIS — Z00.00 ANNUAL PHYSICAL EXAM: ICD-10-CM

## 2022-08-26 DIAGNOSIS — Z23 ENCOUNTER FOR IMMUNIZATION: ICD-10-CM

## 2022-08-26 PROCEDURE — 99395 PREV VISIT EST AGE 18-39: CPT | Performed by: NURSE PRACTITIONER

## 2022-08-26 PROCEDURE — 87591 N.GONORRHOEAE DNA AMP PROB: CPT | Performed by: NURSE PRACTITIONER

## 2022-08-26 PROCEDURE — 90471 IMMUNIZATION ADMIN: CPT | Performed by: NURSE PRACTITIONER

## 2022-08-26 PROCEDURE — 87491 CHLMYD TRACH DNA AMP PROBE: CPT | Performed by: NURSE PRACTITIONER

## 2022-08-26 PROCEDURE — 90715 TDAP VACCINE 7 YRS/> IM: CPT | Performed by: NURSE PRACTITIONER

## 2022-08-26 RX ORDER — PANTOPRAZOLE SODIUM 20 MG/1
20 TABLET, DELAYED RELEASE ORAL
Qty: 30 TABLET | Refills: 5 | Status: SHIPPED | OUTPATIENT
Start: 2022-08-26 | End: 2022-09-20

## 2022-08-26 RX ORDER — FLUOXETINE 10 MG/1
10 TABLET, FILM COATED ORAL DAILY
Qty: 90 TABLET | Refills: 1 | Status: SHIPPED | OUTPATIENT
Start: 2022-08-26 | End: 2022-08-26 | Stop reason: SDUPTHER

## 2022-08-26 RX ORDER — FLUOXETINE 10 MG/1
10 TABLET, FILM COATED ORAL DAILY
Qty: 90 TABLET | Refills: 1 | Status: SHIPPED | OUTPATIENT
Start: 2022-08-26

## 2022-08-26 NOTE — ASSESSMENT & PLAN NOTE
Annual physical completed  Blood work ordered  Reports the Prozac is working will continue same dosage  Discussed management of acid reflux    Encouraged to make appointment with gyn

## 2022-08-26 NOTE — PROGRESS NOTES
ADULT ANNUAL [facility] PRIMARY CARE    NAME: Armaan Lazaro Head  AGE: 23 y o  SEX: female  : 2000     DATE: 2022     Assessment and Plan:     Problem List Items Addressed This Visit        Other    Dysthymia    Relevant Medications    FLUoxetine (PROzac) 10 MG tablet    Annual physical exam     Annual physical completed  Blood work ordered  Reports the Prozac is working will continue same dosage  Discussed management of acid reflux  Encouraged to make appointment with gyn         Relevant Orders    CBC and differential    Comprehensive metabolic panel    Lipid panel      Other Visit Diagnoses     Cervical cancer screening    -  Primary    Relevant Orders    Ambulatory Referral to Obstetrics / Gynecology    Need for hepatitis C screening test        Relevant Orders    Hepatitis C Antibody ([facility])    Screening for HIV (human immunodeficiency virus)        Relevant Orders    HIV 1/2 Antigen/Antibody (4th Generation) w Reflex [facility]    Screening for STDs (sexually transmitted diseases)        Relevant Orders    Chlamydia/GC amplified DNA by PCR    Encounter for immunization        Relevant Orders    TDAP VACCINE GREATER THAN OR EQUAL TO 8YO IM (Completed)    Gastroesophageal reflux disease with esophagitis without hemorrhage        Relevant Medications    pantoprazole (PROTONIX) 20 mg tablet          Immunizations and preventive care screenings were discussed with patient today  Appropriate education was printed on patient's after visit summary  Counseling:  Alcohol/drug use: discussed moderation in alcohol intake, the recommendations for healthy alcohol use, and avoidance of illicit drug use  Dental Health: discussed importance of regular tooth brushing, flossing, and dental visits    Injury prevention: discussed safety/seat belts, safety helmets, smoke detectors, carbon dioxide detectors, and smoking near bedding or upholstery  Sexual health: discussed sexually transmitted diseases, partner selection, use of condoms, avoidance of unintended pregnancy, and contraceptive alternatives  · Exercise: the importance of regular exercise/physical activity was discussed  Recommend exercise 3-5 times per week for at least 30 minutes  BMI Counseling: Body mass index is 29 5 kg/m²  The BMI is above normal  Nutrition recommendations include decreasing portion sizes, encouraging healthy choices of fruits and vegetables, decreasing fast food intake, consuming healthier snacks, limiting drinks that contain sugar, moderation in carbohydrate intake, increasing intake of lean protein, reducing intake of saturated and trans fat and reducing intake of cholesterol  Exercise recommendations include exercising 3-5 times per week and strength training exercises  Rationale for BMI follow-up plan is due to patient being overweight or obese  No follow-ups on file  Chief Complaint:     Chief Complaint   Patient presents with    Physical Exam      History of Present Illness:     Adult Annual Physical   Patient here for a comprehensive physical exam  The patient reports no problems  Diet and Physical Activity  · Diet/Nutrition: well balanced diet and consuming 3-5 servings of fruits/vegetables daily  · Exercise: 5-7 times a week on average        Depression Screening  PHQ-2/9 Depression Screening    Little interest or pleasure in doing things: 3 - nearly every day  Feeling down, depressed, or hopeless: 2 - more than half the days  Trouble falling or staying asleep, or sleeping too much: 0 - not at all  Feeling tired or having little energy: 0 - not at all  Poor appetite or overeatin - not at all  Feeling bad about yourself - or that you are a failure or have let yourself or your family down: 3 - nearly every day  Trouble concentrating on things, such as reading the newspaper or watching television: 2 - more than half the days  Moving or speaking so slowly that other people could have noticed  Or the opposite - being so fidgety or restless that you have been moving around a lot more than usual: 0 - not at all  Thoughts that you would be better off dead, or of hurting yourself in some way: 0 - not at all  PHQ-9 Score: 10   PHQ-9 Interpretation: Moderate depression        General Health  · Sleep: gets 7-8 hours of sleep on average  · Hearing: normal - bilateral   · Vision: no vision problems  · Dental: regular dental visits  /GYN Health  · Last menstrual period: 8/2022  · Contraceptive method: has nexplanon but wants it taken out    · History of STDs?: no      Review of Systems:     Review of Systems   Past Medical History:     Past Medical History:   Diagnosis Date    Asthma     Dysthymia 5/15/2019      Past Surgical History:     Past Surgical History:   Procedure Laterality Date    NO PAST SURGERIES        Social History:     Social History     Socioeconomic History    Marital status: Single     Spouse name: None    Number of children: None    Years of education: None    Highest education level: None   Occupational History    None   Tobacco Use    Smoking status: Never Smoker    Smokeless tobacco: Never Used   Substance and Sexual Activity    Alcohol use: No    Drug use: No    Sexual activity: Yes     Partners: Male     Birth control/protection: Implant     Comment: Nexplanon placed 08/26/2019   Other Topics Concern    None   Social History Narrative    None     Social Determinants of Health     Financial Resource Strain: Not on file   Food Insecurity: Not on file   Transportation Needs: Not on file   Physical Activity: Not on file   Stress: Not on file   Social Connections: Not on file   Intimate Partner Violence: Not on file   Housing Stability: Not on file      Family History:     Family History   Problem Relation Age of Onset    Breast cancer Other     No Known Problems Mother     No Known Problems Father       Current Medications:     Current Outpatient Medications   Medication Sig Dispense Refill    albuterol (Ventolin HFA) 90 mcg/act inhaler Inhale 2 puffs every 6 (six) hours as needed for wheezing 18 g 1    Cholecalciferol (Vitamin D3) 50 MCG (2000 UT) capsule TAKE 1 CAPSULE BY MOUTH EVERY DAY 30 capsule 0    etonogestrel (NEXPLANON) subdermal implant Inject 68 mg under the skin continuous      FLUoxetine (PROzac) 10 MG tablet Take 1 tablet (10 mg total) by mouth daily 90 tablet 1    montelukast (SINGULAIR) 10 mg tablet Take 1 tablet (10 mg total) by mouth daily at bedtime (Patient taking differently: Take 10 mg by mouth daily as needed) 30 tablet 5    pantoprazole (PROTONIX) 20 mg tablet Take 1 tablet (20 mg total) by mouth daily before breakfast 30 tablet 5    guaiFENesin (MUCINEX) 600 mg 12 hr tablet Take 1 tablet (600 mg total) by mouth every 12 (twelve) hours (Patient not taking: Reported on 8/26/2022) 60 tablet 0    loratadine (CLARITIN) 10 mg tablet Take 10 mg by mouth daily (Patient not taking: Reported on 8/26/2022)       No current facility-administered medications for this visit  Allergies:     No Known Allergies   Physical Exam:     /76 (BP Location: Left arm, Patient Position: Sitting, Cuff Size: Adult)   Pulse 101   Temp 98 2 °F (36 8 °C) (Temporal)   Ht 5' 5 5" (1 664 m)   Wt 81 6 kg (180 lb)   SpO2 98%   BMI 29 50 kg/m²     Physical Exam  Constitutional:       General: She is not in acute distress  Appearance: Normal appearance  She is obese  She is not ill-appearing  HENT:      Head: Normocephalic and atraumatic  Nose: Nose normal       Mouth/Throat:      Mouth: Mucous membranes are moist    Eyes:      Pupils: Pupils are equal, round, and reactive to light  Cardiovascular:      Rate and Rhythm: Normal rate and regular rhythm  Pulses: Normal pulses  Heart sounds: Normal heart sounds     Pulmonary:      Effort: Pulmonary effort is normal  No respiratory distress  Breath sounds: Normal breath sounds  Chest:      Chest wall: No tenderness  Abdominal:      General: Abdomen is flat  Bowel sounds are normal  There is no distension  Palpations: There is no mass  Tenderness: There is no abdominal tenderness  Musculoskeletal:         General: Normal range of motion  Cervical back: Normal range of motion and neck supple  Skin:     General: Skin is warm and dry  Neurological:      General: No focal deficit present  Mental Status: She is alert and oriented to person, place, and time  Psychiatric:         Mood and Affect: Mood normal          Behavior: Behavior normal          Thought Content: Thought content normal          Judgment: Judgment normal         Depression: well controlled on Fluoxetine  Denies anxiety - stress about the wedding         Cassie Galindo [facility] PRIMARY CARE

## 2022-08-26 NOTE — PATIENT INSTRUCTIONS
Obtain fasting labs, nothing to eat after midnight, may drink water  GERD (Gastroesophageal Reflux Disease)   WHAT YOU NEED TO KNOW:   Gastroesophageal reflux disease (GERD) is reflux that happens more than 2 times a week for a few weeks  Reflux means acid and food in your stomach back up into your esophagus  GERD can cause other health problems over time if it is not treated  DISCHARGE INSTRUCTIONS:   Call your local emergency number (911 in the [address] [facility]) if:   You have severe chest pain and sudden trouble breathing  Return to the emergency department if:   You have trouble breathing after you vomit  You have trouble swallowing, or pain with swallowing  Your bowel movements are black, bloody, or tarry-looking  Your vomit looks like coffee grounds or has blood in it  Call your doctor or gastroenterologist if:   You feel full and cannot burp or vomit  You vomit large amounts, or you vomit often  You are losing weight without trying  Your symptoms get worse or do not improve with treatment  You have questions or concerns about your condition or care  Medicines:   Medicines  are used to decrease stomach acid  Medicine may also be used to help your lower esophageal sphincter and stomach contract (tighten) more  Take your medicine as directed  Contact your healthcare provider if you think your medicine is not helping or if you have side effects  Tell him of her if you are allergic to any medicine  Keep a list of the medicines, vitamins, and herbs you take  Include the amounts, and when and why you take them  Bring the list or the pill bottles to follow-up visits  Carry your medicine list with you in case of an emergency  Manage GERD:       Do not have foods or drinks that may increase heartburn  These include chocolate, peppermint, fried or fatty foods, drinks that contain caffeine, or carbonated drinks (soda)   Other foods include spicy foods, onions, tomatoes, and tomato-based foods  Do not have foods or drinks that can irritate your esophagus, such as citrus fruits, juices, and alcohol  Do not eat large meals  When you eat a lot of food at one time, your stomach needs more acid to digest it  Eat 6 small meals each day instead of 3 large ones, and eat slowly  Do not eat meals 2 to 3 hours before bedtime  Elevate the head of your bed  Place 6-inch blocks under the head of your bed frame  You may also use more than one pillow under your head and shoulders while you sleep  Maintain a healthy weight  If you are overweight, weight loss may help relieve symptoms of GERD  Do not smoke  Smoking weakens the lower esophageal sphincter and increases the risk of GERD  Ask your healthcare provider for information if you currently smoke and need help to quit  E-cigarettes or smokeless tobacco still contain nicotine  Talk to your healthcare provider before you use these products  Do not put pressure on your abdomen  Pressure pushes acid up into your esophagus  Do not wear clothing that is tight around your waist  Do not bend over  Bend at the knees if you need to pick something up  Follow up with your doctor or gastroenterologist as directed:  Write down your questions so you remember to ask them during your visits  © [employer] 2022 Information is for End User's use only and may not be sold, redistributed or otherwise used for commercial purposes  All illustrations and images included in CareNotes® are the copyrighted property of A D A M , Inc  or [facility] Mar[facility] Carlos   The above information is an  only  It is not intended as medical advice for individual conditions or treatments  Talk to your doctor, nurse or pharmacist before following any medical regimen to see if it is safe and effective for you  Wellness Visit for Adults   AMBULATORY CARE:   A wellness visit  is when you see your healthcare provider to get screened for health problems   Your healthcare provider will also give you advice on how to stay healthy  Write down your questions so you remember to ask them  Ask your healthcare provider how often you should have a wellness visit  What happens at a wellness visit:  Your healthcare provider will ask about your health, and your family history of health problems  This includes high blood pressure, heart disease, and cancer  He or she will ask if you have symptoms that concern you, if you smoke, and about your mood  You may also be asked about your intake of medicines, supplements, food, and alcohol  Any of the following may be done: Your weight  will be checked  Your height may also be checked so your body mass index (BMI) can be calculated  Your BMI shows if you are at a healthy weight  Your blood pressure  and heart rate will be checked  Your temperature may also be checked  Blood and urine tests  may be done  Blood tests may be done to check your cholesterol levels  Abnormal cholesterol levels increase your risk for heart disease and stroke  You may also need a blood or urine test to check for diabetes if you are at increased risk  Urine tests may be done to look for signs of an infection or kidney disease  A physical exam  includes checking your heartbeat and lungs with a stethoscope  Your healthcare provider may also check your skin to look for sun damage  Screening tests  may be recommended  A screening test is done to check for diseases that may not cause symptoms  The screening tests you may need depend on your age, gender, family history, and lifestyle habits  For example, colorectal screening may be recommended if you are 48years old or older  Screening tests you need if you are a woman:   A Pap smear  is used to screen for cervical cancer  Pap smears are usually done every 3 to 5 years depending on your age   You may need them more often if you have had abnormal Pap smear test results in the past  Ask your healthcare provider how often you should have a Pap smear  A mammogram  is an x-ray of your breasts to screen for breast cancer  Experts recommend mammograms every 2 years starting at age 48 years  You may need a mammogram at age 52 years or younger if you have an increased risk for breast cancer  Talk to your healthcare provider about when you should start having mammograms and how often you need them  Vaccines you may need:   Get an influenza vaccine  every year  The influenza vaccine protects you from the flu  Several types of viruses cause the flu  The viruses change over time, so new vaccines are made each year  Get a tetanus-diphtheria (Td) booster vaccine  every 10 years  This vaccine protects you against tetanus and diphtheria  Tetanus is a severe infection that may cause painful muscle spasms and lockjaw  Diphtheria is a severe bacterial infection that causes a thick covering in the back of your mouth and throat  Get a human papillomavirus (HPV) vaccine  if you are female and aged 23 to 32 or male 23 to 24 and never received it  This vaccine protects you from HPV infection  HPV is the most common infection spread by sexual contact  HPV may also cause vaginal, penile, and anal cancers  Get a pneumococcal vaccine  if you are aged 72 years or older  The pneumococcal vaccine is an injection given to protect you from pneumococcal disease  Pneumococcal disease is an infection caused by pneumococcal bacteria  The infection may cause pneumonia, meningitis, or an ear infection  Get a shingles vaccine  if you are 60 or older, even if you have had shingles before  The shingles vaccine is an injection to protect you from the varicella-zoster virus  This is the same virus that causes chickenpox  Shingles is a painful rash that develops in people who had chickenpox or have been exposed to the virus  How to eat healthy:  My Plate is a model for planning healthy meals   It shows the types and amounts of foods that should go on your plate  Fruits and vegetables make up about half of your plate, and grains and protein make up the other half  A serving of dairy is included on the side of your plate  The amount of calories and serving sizes you need depends on your age, gender, weight, and height  Examples of healthy foods are listed below:  Eat a variety of vegetables  such as dark green, red, and orange vegetables  You can also include canned vegetables low in sodium (salt) and frozen vegetables without added butter or sauces  Eat a variety of fresh fruits , canned fruit in 100% juice, frozen fruit, and dried fruit  Include whole grains  At least half of the grains you eat should be whole grains  Examples include whole-wheat bread, wheat pasta, brown rice, and whole-grain cereals such as oatmeal     Eat a variety of protein foods such as seafood (fish and shellfish), lean meat, and poultry without skin (turkey and chicken)  Examples of lean meats include pork leg, shoulder, or tenderloin, and beef round, sirloin, tenderloin, and extra lean ground beef  Other protein foods include eggs and egg substitutes, beans, peas, soy products, nuts, and seeds  Choose low-fat dairy products such as skim or 1% milk or low-fat yogurt, cheese, and cottage cheese  Limit unhealthy fats  such as butter, hard margarine, and shortening  Exercise:  Exercise at least 30 minutes per day on most days of the week  Some examples of exercise include walking, biking, dancing, and swimming  You can also fit in more physical activity by taking the stairs instead of the elevator or parking farther away from stores  Include muscle strengthening activities 2 days each week  Regular exercise provides many health benefits  It helps you manage your weight, and decreases your risk for type 2 diabetes, heart disease, stroke, and high blood pressure  Exercise can also help improve your mood   Ask your healthcare provider about the best exercise plan for you  General health and safety guidelines:   Do not smoke  Nicotine and other chemicals in cigarettes and cigars can cause lung damage  Ask your healthcare provider for information if you currently smoke and need help to quit  E-cigarettes or smokeless tobacco still contain nicotine  Talk to your healthcare provider before you use these products  Limit alcohol  A drink of alcohol is 12 ounces of beer, 5 ounces of wine, or 1½ ounces of liquor  Lose weight, if needed  Being overweight increases your risk of certain health conditions  These include heart disease, high blood pressure, type 2 diabetes, and certain types of cancer  Protect your skin  Do not sunbathe or use tanning beds  Use sunscreen with a SPF 15 or higher  Apply sunscreen at least 15 minutes before you go outside  Reapply sunscreen every 2 hours  Wear protective clothing, hats, and sunglasses when you are outside  Drive safely  Always wear your seatbelt  Make sure everyone in your car wears a seatbelt  A seatbelt can save your life if you are in an accident  Do not use your cell phone when you are driving  This could distract you and cause an accident  Pull over if you need to make a call or send a text message  Practice safe sex  Use latex condoms if are sexually active and have more than one partner  Your healthcare provider may recommend screening tests for sexually transmitted infections (STIs)  Wear helmets, lifejackets, and protective gear  Always wear a helmet when you ride a bike or motorcycle, go skiing, or play sports that could cause a head injury  Wear protective equipment when you play sports  Wear a lifejacket when you are on a boat or doing water sports  © Copyright [employer] 2022 Information is for End User's use only and may not be sold, redistributed or otherwise used for commercial purposes   All illustrations and images included in CareNotes® are the copyrighted property of [employer]  or [address]  The above information is an  only  It is not intended as medical advice for individual conditions or treatments  Talk to your doctor, nurse or pharmacist before following any medical regimen to see if it is safe and effective for you

## 2022-08-27 LAB
C TRACH DNA SPEC QL NAA+PROBE: NEGATIVE
N GONORRHOEA DNA SPEC QL NAA+PROBE: NEGATIVE

## 2022-09-13 DIAGNOSIS — J45.21 MILD INTERMITTENT ASTHMA WITH ACUTE EXACERBATION: ICD-10-CM

## 2022-09-13 RX ORDER — ALBUTEROL SULFATE 90 UG/1
2 AEROSOL, METERED RESPIRATORY (INHALATION) EVERY 6 HOURS PRN
Qty: 18 G | Refills: 1 | Status: SHIPPED | OUTPATIENT
Start: 2022-09-13

## 2022-09-17 DIAGNOSIS — K21.00 GASTROESOPHAGEAL REFLUX DISEASE WITH ESOPHAGITIS WITHOUT HEMORRHAGE: ICD-10-CM

## 2022-09-20 RX ORDER — PANTOPRAZOLE SODIUM 20 MG/1
TABLET, DELAYED RELEASE ORAL
Qty: 90 TABLET | Refills: 2 | Status: SHIPPED | OUTPATIENT
Start: 2022-09-20

## 2022-11-08 DIAGNOSIS — J45.21 MILD INTERMITTENT ASTHMA WITH ACUTE EXACERBATION: ICD-10-CM

## 2022-11-08 RX ORDER — ALBUTEROL SULFATE 90 UG/1
AEROSOL, METERED RESPIRATORY (INHALATION)
Qty: 8 G | Refills: 1 | Status: SHIPPED | OUTPATIENT
Start: 2022-11-08

## 2022-11-14 ENCOUNTER — APPOINTMENT (EMERGENCY)
Dept: ULTRASOUND IMAGING | Facility: HOSPITAL | Age: 22
End: 2022-11-14

## 2022-11-14 ENCOUNTER — APPOINTMENT (EMERGENCY)
Dept: CT IMAGING | Facility: HOSPITAL | Age: 22
End: 2022-11-14

## 2022-11-14 ENCOUNTER — HOSPITAL ENCOUNTER (EMERGENCY)
Facility: HOSPITAL | Age: 22
End: 2022-11-14
Attending: EMERGENCY MEDICINE

## 2022-11-14 ENCOUNTER — HOSPITAL ENCOUNTER (OUTPATIENT)
Facility: HOSPITAL | Age: 22
Setting detail: OUTPATIENT SURGERY
Discharge: HOME | End: 2022-11-15
Attending: STUDENT IN AN ORGANIZED HEALTH CARE EDUCATION/TRAINING PROGRAM | Admitting: STUDENT IN AN ORGANIZED HEALTH CARE EDUCATION/TRAINING PROGRAM

## 2022-11-14 VITALS
DIASTOLIC BLOOD PRESSURE: 80 MMHG | RESPIRATION RATE: 21 BRPM | TEMPERATURE: 97.2 F | SYSTOLIC BLOOD PRESSURE: 140 MMHG | OXYGEN SATURATION: 96 % | HEART RATE: 84 BPM

## 2022-11-14 DIAGNOSIS — R10.2 PELVIC PAIN: Primary | ICD-10-CM

## 2022-11-14 DIAGNOSIS — R10.9 ABDOMINAL PAIN: Primary | ICD-10-CM

## 2022-11-14 DIAGNOSIS — N83.519 OVARIAN TORSION: ICD-10-CM

## 2022-11-14 LAB
ALBUMIN SERPL BCP-MCNC: 4.6 G/DL (ref 3.5–5)
ALP SERPL-CCNC: 67 U/L (ref 46–116)
ALT SERPL W P-5'-P-CCNC: 29 U/L (ref 12–78)
ANION GAP SERPL CALCULATED.3IONS-SCNC: 12 MMOL/L (ref 4–13)
AST SERPL W P-5'-P-CCNC: 9 U/L (ref 5–45)
BACTERIA UR QL AUTO: ABNORMAL /HPF
BASOPHILS # BLD AUTO: 0.09 THOUSANDS/ÂΜL (ref 0–0.1)
BASOPHILS NFR BLD AUTO: 1 % (ref 0–1)
BILIRUB SERPL-MCNC: 0.4 MG/DL (ref 0.2–1)
BILIRUB UR QL STRIP: NEGATIVE
BUN SERPL-MCNC: 12 MG/DL (ref 5–25)
CALCIUM SERPL-MCNC: 9.8 MG/DL (ref 8.3–10.1)
CHLORIDE SERPL-SCNC: 104 MMOL/L (ref 96–108)
CLARITY UR: CLEAR
CO2 SERPL-SCNC: 23 MMOL/L (ref 21–32)
COLOR UR: ABNORMAL
CREAT SERPL-MCNC: 0.63 MG/DL (ref 0.6–1.3)
EOSINOPHIL # BLD AUTO: 0.19 THOUSAND/ÂΜL (ref 0–0.61)
EOSINOPHIL NFR BLD AUTO: 1 % (ref 0–6)
ERYTHROCYTE [DISTWIDTH] IN BLOOD BY AUTOMATED COUNT: 12.7 % (ref 11.6–15.1)
EXT PREG TEST URINE: NEGATIVE
EXT. CONTROL ED NAV: NORMAL
GFR SERPL CREATININE-BSD FRML MDRD: 125 ML/MIN/1.73SQ M
GLUCOSE SERPL-MCNC: 99 MG/DL (ref 65–140)
GLUCOSE UR STRIP-MCNC: NEGATIVE MG/DL
HCT VFR BLD AUTO: 44.5 % (ref 34.8–46.1)
HGB BLD-MCNC: 14.5 G/DL (ref 11.5–15.4)
HGB UR QL STRIP.AUTO: ABNORMAL
HYALINE CASTS #/AREA URNS LPF: ABNORMAL /LPF
IMM GRANULOCYTES # BLD AUTO: 0.03 THOUSAND/UL (ref 0–0.2)
IMM GRANULOCYTES NFR BLD AUTO: 0 % (ref 0–2)
KETONES UR STRIP-MCNC: NEGATIVE MG/DL
LEUKOCYTE ESTERASE UR QL STRIP: NEGATIVE
LIPASE SERPL-CCNC: 78 U/L (ref 73–393)
LYMPHOCYTES # BLD AUTO: 1.63 THOUSANDS/ÂΜL (ref 0.6–4.47)
LYMPHOCYTES NFR BLD AUTO: 12 % (ref 14–44)
MCH RBC QN AUTO: 30 PG (ref 26.8–34.3)
MCHC RBC AUTO-ENTMCNC: 32.6 G/DL (ref 31.4–37.4)
MCV RBC AUTO: 92 FL (ref 82–98)
MONOCYTES # BLD AUTO: 0.78 THOUSAND/ÂΜL (ref 0.17–1.22)
MONOCYTES NFR BLD AUTO: 6 % (ref 4–12)
MUCOUS THREADS UR QL AUTO: ABNORMAL
NEUTROPHILS # BLD AUTO: 11.18 THOUSANDS/ÂΜL (ref 1.85–7.62)
NEUTS SEG NFR BLD AUTO: 80 % (ref 43–75)
NITRITE UR QL STRIP: NEGATIVE
NON-SQ EPI CELLS URNS QL MICRO: ABNORMAL /HPF
NRBC BLD AUTO-RTO: 0 /100 WBCS
PH UR STRIP.AUTO: 6 [PH]
PLATELET # BLD AUTO: 358 THOUSANDS/UL (ref 149–390)
PMV BLD AUTO: 9.8 FL (ref 8.9–12.7)
POTASSIUM SERPL-SCNC: 4.2 MMOL/L (ref 3.5–5.3)
PROT SERPL-MCNC: 8.6 G/DL (ref 6.4–8.4)
PROT UR STRIP-MCNC: NEGATIVE MG/DL
RBC # BLD AUTO: 4.83 MILLION/UL (ref 3.81–5.12)
RBC #/AREA URNS AUTO: ABNORMAL /HPF
SODIUM SERPL-SCNC: 139 MMOL/L (ref 135–147)
SP GR UR STRIP.AUTO: 1.02 (ref 1–1.03)
UROBILINOGEN UR STRIP-ACNC: <2 MG/DL
WBC # BLD AUTO: 13.9 THOUSAND/UL (ref 4.31–10.16)
WBC #/AREA URNS AUTO: ABNORMAL /HPF

## 2022-11-14 RX ORDER — ONDANSETRON 2 MG/ML
4 INJECTION INTRAMUSCULAR; INTRAVENOUS ONCE
Status: COMPLETED | OUTPATIENT
Start: 2022-11-14 | End: 2022-11-14

## 2022-11-14 RX ORDER — ONDANSETRON 2 MG/ML
4 INJECTION INTRAMUSCULAR; INTRAVENOUS EVERY 6 HOURS PRN
Status: DISCONTINUED | OUTPATIENT
Start: 2022-11-14 | End: 2022-11-15

## 2022-11-14 RX ORDER — ACETAMINOPHEN 325 MG/1
650 TABLET ORAL EVERY 6 HOURS PRN
Status: DISCONTINUED | OUTPATIENT
Start: 2022-11-14 | End: 2022-11-15

## 2022-11-14 RX ORDER — KETOROLAC TROMETHAMINE 30 MG/ML
15 INJECTION, SOLUTION INTRAMUSCULAR; INTRAVENOUS ONCE
Status: COMPLETED | OUTPATIENT
Start: 2022-11-14 | End: 2022-11-14

## 2022-11-14 RX ORDER — MORPHINE SULFATE 4 MG/ML
4 INJECTION, SOLUTION INTRAMUSCULAR; INTRAVENOUS ONCE
Status: COMPLETED | OUTPATIENT
Start: 2022-11-14 | End: 2022-11-14

## 2022-11-14 RX ORDER — OXYCODONE HYDROCHLORIDE 5 MG/1
5 TABLET ORAL EVERY 4 HOURS PRN
Status: DISCONTINUED | OUTPATIENT
Start: 2022-11-14 | End: 2022-11-15

## 2022-11-14 RX ORDER — HYDROMORPHONE HCL/PF 1 MG/ML
1 SYRINGE (ML) INJECTION ONCE
Status: COMPLETED | OUTPATIENT
Start: 2022-11-14 | End: 2022-11-14

## 2022-11-14 RX ORDER — DEXTROSE AND SODIUM CHLORIDE 5; .45 G/100ML; G/100ML
125 INJECTION, SOLUTION INTRAVENOUS CONTINUOUS
Status: DISCONTINUED | OUTPATIENT
Start: 2022-11-15 | End: 2022-11-15

## 2022-11-14 RX ORDER — OXYCODONE HYDROCHLORIDE 10 MG/1
10 TABLET ORAL EVERY 4 HOURS PRN
Status: DISCONTINUED | OUTPATIENT
Start: 2022-11-14 | End: 2022-11-15

## 2022-11-14 RX ADMIN — ONDANSETRON 4 MG: 2 INJECTION INTRAMUSCULAR; INTRAVENOUS at 16:26

## 2022-11-14 RX ADMIN — SODIUM CHLORIDE 1000 ML: 0.9 INJECTION, SOLUTION INTRAVENOUS at 16:27

## 2022-11-14 RX ADMIN — IOHEXOL 100 ML: 350 INJECTION, SOLUTION INTRAVENOUS at 16:51

## 2022-11-14 RX ADMIN — HYDROMORPHONE HYDROCHLORIDE 1 MG: 1 INJECTION, SOLUTION INTRAMUSCULAR; INTRAVENOUS; SUBCUTANEOUS at 21:39

## 2022-11-14 RX ADMIN — KETOROLAC TROMETHAMINE 15 MG: 30 INJECTION, SOLUTION INTRAMUSCULAR at 16:27

## 2022-11-14 RX ADMIN — HYDROMORPHONE HYDROCHLORIDE 1 MG: 1 INJECTION, SOLUTION INTRAMUSCULAR; INTRAVENOUS; SUBCUTANEOUS at 19:58

## 2022-11-14 RX ADMIN — MORPHINE SULFATE 4 MG: 4 INJECTION INTRAVENOUS at 18:44

## 2022-11-14 NOTE — ED PROVIDER NOTES
History  Chief Complaint   Patient presents with   • Abdominal Pain     Patient c/o right lower flank pain that radiates around back  Patient c/o nausea and vomiting  Patient denies diarrhea  23 y o  F presents w right lower flank pain that radiates around to right lower quadrant  This is associated with nausea and vomiting  Patient states the pain started this morning that it is severe  Patient denies urinary symptoms  She states that she started having menstrual bleeding although it is not time for her menstrual cycle  She denies possibility of pregnancy, does have nexaplon implanted  No pus discharge  Prior to Admission Medications   Prescriptions Last Dose Informant Patient Reported? Taking? Cholecalciferol (Vitamin D3) 50 MCG (2000 UT) capsule  Self No No   Sig: TAKE 1 CAPSULE BY MOUTH EVERY DAY   FLUoxetine (PROzac) 10 MG tablet   No No   Sig: Take 1 tablet (10 mg total) by mouth daily   albuterol (PROVENTIL HFA,VENTOLIN HFA) 90 mcg/act inhaler   No No   Sig: INHALE 2 PUFFS EVERY 6 HOURS AS NEEDED FOR WHEEZING   etonogestrel (NEXPLANON) subdermal implant  Self Yes No   Sig: Inject 68 mg under the skin continuous   guaiFENesin (MUCINEX) 600 mg 12 hr tablet  Self No No   Sig: Take 1 tablet (600 mg total) by mouth every 12 (twelve) hours   Patient not taking: Reported on 8/26/2022   loratadine (CLARITIN) 10 mg tablet  Self Yes No   Sig: Take 10 mg by mouth daily   Patient not taking: Reported on 8/26/2022   montelukast (SINGULAIR) 10 mg tablet  Self No No   Sig: Take 1 tablet (10 mg total) by mouth daily at bedtime   Patient taking differently: Take 10 mg by mouth daily as needed   pantoprazole (PROTONIX) 20 mg tablet   No No   Sig: TAKE 1 TABLET BY MOUTH DAILY BEFORE BREAKFAST        Facility-Administered Medications: None       Past Medical History:   Diagnosis Date   • Asthma    • Dysthymia 5/15/2019       Past Surgical History:   Procedure Laterality Date   • NO PAST SURGERIES Family History   Problem Relation Age of Onset   • Breast cancer Other    • No Known Problems Mother    • No Known Problems Father      I have reviewed and agree with the history as documented  E-Cigarette/Vaping   • E-Cigarette Use Never User      E-Cigarette/Vaping Substances     Social History     Tobacco Use   • Smoking status: Never Smoker   • Smokeless tobacco: Never Used   Vaping Use   • Vaping Use: Never used   Substance Use Topics   • Alcohol use: No   • Drug use: No       Review of Systems   Constitutional: Negative for chills and fever  HENT: Negative for congestion and rhinorrhea  Respiratory: Negative for chest tightness and shortness of breath  Cardiovascular: Negative for chest pain and leg swelling  Gastrointestinal: Positive for abdominal pain (RLQ), nausea and vomiting  Negative for constipation and diarrhea  Genitourinary: Positive for flank pain (R sided) and menstrual problem  Negative for dysuria, frequency and hematuria  Musculoskeletal: Negative for back pain and neck pain  Skin: Negative for wound  Neurological: Negative for dizziness and headaches  Physical Exam  Physical Exam  Vitals reviewed  Constitutional:       General: She is not in acute distress  Appearance: She is well-developed  She is not diaphoretic  HENT:      Head: Normocephalic and atraumatic  Eyes:      General: No scleral icterus  Right eye: No discharge  Left eye: No discharge  Conjunctiva/sclera: Conjunctivae normal       Pupils: Pupils are equal, round, and reactive to light  Neck:      Vascular: No JVD  Cardiovascular:      Rate and Rhythm: Normal rate and regular rhythm  Heart sounds: Normal heart sounds  No murmur heard  No friction rub  No gallop  Pulmonary:      Effort: Pulmonary effort is normal  No respiratory distress  Breath sounds: Normal breath sounds  No wheezing or rales  Chest:      Chest wall: No tenderness     Abdominal: General: Bowel sounds are normal  There is no distension  Palpations: Abdomen is soft  Tenderness: There is abdominal tenderness in the right lower quadrant  There is right CVA tenderness  There is no guarding or rebound  Negative signs include McBurney's sign  Hernia: No hernia is present  Musculoskeletal:         General: No tenderness or deformity  Normal range of motion  Cervical back: Normal range of motion and neck supple  Skin:     General: Skin is warm and dry  Coloration: Skin is not pale  Findings: No erythema or rash  Neurological:      Mental Status: She is alert and oriented to person, place, and time  Cranial Nerves: No cranial nerve deficit     Psychiatric:         Behavior: Behavior normal          Vital Signs  ED Triage Vitals   Temperature Pulse Respirations Blood Pressure SpO2   11/14/22 1439 11/14/22 1439 11/14/22 1439 11/14/22 1439 11/14/22 1439   (!) 97 2 °F (36 2 °C) 100 16 140/98 100 %      Temp Source Heart Rate Source Patient Position - Orthostatic VS BP Location FiO2 (%)   11/14/22 1439 11/14/22 1439 11/14/22 1439 11/14/22 1439 --   Temporal Monitor Sitting Left arm       Pain Score       11/14/22 1627       8           Vitals:    11/14/22 1730 11/14/22 1915 11/14/22 1930 11/14/22 2100   BP: 122/77  140/80    Pulse: 86 68 75 84   Patient Position - Orthostatic VS:             Visual Acuity      ED Medications  Medications   ketorolac (TORADOL) injection 15 mg (15 mg Intravenous Given 11/14/22 1627)   ondansetron (ZOFRAN) injection 4 mg (4 mg Intravenous Given 11/14/22 1626)   sodium chloride 0 9 % bolus 1,000 mL (0 mL Intravenous Stopped 11/14/22 2031)   iohexol (OMNIPAQUE) 350 MG/ML injection (SINGLE-DOSE) 100 mL (100 mL Intravenous Given 11/14/22 1651)   morphine injection 4 mg (4 mg Intravenous Given 11/14/22 1844)   HYDROmorphone (DILAUDID) injection 1 mg (1 mg Intravenous Given 11/14/22 1958)   HYDROmorphone (DILAUDID) injection 1 mg (1 mg Intravenous Given 11/14/22 2139)       Diagnostic Studies  Results Reviewed     Procedure Component Value Units Date/Time    Urine Microscopic [[id number]]  (Abnormal) Collected: 11/14/22 1602    Lab Status: Final result Specimen: Urine, Clean Catch Updated: 11/14/22 1615     RBC, UA 4-10 /hpf      WBC, UA 2-4 /hpf      Epithelial Cells Occasional /hpf      Bacteria, UA None Seen /hpf      MUCUS THREADS Occasional     Hyaline Casts, UA 0-3 /lpf     UA w Reflex to Microscopic w Reflex to Culture [[id number]]  (Abnormal) Collected: 11/14/22 1602    Lab Status: Final result Specimen: Urine, Clean Catch Updated: 11/14/22 1611     Color, UA Light Yellow     Clarity, UA Clear     Specific Gravity, UA 1 021     pH, UA 6 0     Leukocytes, UA Negative     Nitrite, UA Negative     Protein, UA Negative mg/dl      Glucose, UA Negative mg/dl      Ketones, UA Negative mg/dl      Urobilinogen, UA <2 0 mg/dl      Bilirubin, UA Negative     Occult Blood, UA Large    POCT pregnancy, urine [[id number]]  (Normal) Resulted: 11/14/22 1610    Lab Status: Final result Specimen: Urine Updated: 11/14/22 1611     EXT PREG TEST UR (Ref: Negative) Negative     Control Valid    Comprehensive metabolic panel [[id number]]  (Abnormal) Collected: 11/14/22 1444    Lab Status: Final result Specimen: Blood from Hand, Right Updated: 11/14/22 1510     Sodium 139 mmol/L      Potassium 4 2 mmol/L      Chloride 104 mmol/L      CO2 23 mmol/L      ANION GAP 12 mmol/L      BUN 12 mg/dL      Creatinine 0 63 mg/dL      Glucose 99 mg/dL      Calcium 9 8 mg/dL      AST 9 U/L      ALT 29 U/L      Alkaline Phosphatase 67 U/L      Total Protein 8 6 g/dL      Albumin 4 6 g/dL      Total Bilirubin 0 40 mg/dL      eGFR 125 ml/min/1 73sq m     Narrative:      National Kidney Disease Foundation guidelines for Chronic Kidney Disease (CKD):   •  Stage 1 with normal or high GFR (GFR > 90 mL/min/1 73 square meters)  •  Stage 2 Mild CKD (GFR = 60-89 mL/min/1 73 square meters)  • Stage 3A Moderate CKD (GFR = 45-59 mL/min/1 73 square meters)  •  Stage 3B Moderate CKD (GFR = 30-44 mL/min/1 73 square meters)  •  Stage 4 Severe CKD (GFR = 15-29 mL/min/1 73 square meters)  •  Stage 5 End Stage CKD (GFR <15 mL/min/1 73 square meters)  Note: GFR calculation is accurate only with a steady state creatinine    Lipase [[id number]]  (Normal) Collected: 11/14/22 1444    Lab Status: Final result Specimen: Blood from Hand, Right Updated: 11/14/22 1510     Lipase 78 u/L     CBC and differential [[id number]]  (Abnormal) Collected: 11/14/22 1444    Lab Status: Final result Specimen: Blood from Hand, Right Updated: 11/14/22 1451     WBC 13 90 Thousand/uL      RBC 4 83 Million/uL      Hemoglobin 14 5 g/dL      Hematocrit 44 5 %      MCV 92 fL      MCH 30 0 pg      MCHC 32 6 g/dL      RDW 12 7 %      MPV 9 8 fL      Platelets 934 Thousands/uL      nRBC 0 /100 WBCs      Neutrophils Relative 80 %      Immat GRANS % 0 %      Lymphocytes Relative 12 %      Monocytes Relative 6 %      Eosinophils Relative 1 %      Basophils Relative 1 %      Neutrophils Absolute 11 18 Thousands/µL      Immature Grans Absolute 0 03 Thousand/uL      Lymphocytes Absolute 1 63 Thousands/µL      Monocytes Absolute 0 78 Thousand/µL      Eosinophils Absolute 0 19 Thousand/µL      Basophils Absolute 0 09 Thousands/µL                  US pelvis complete w transvaginal   ED Interpretation by Juliana Car DO (11/14 1944)   1  Though normal arterial and venous waveforms are currently demonstrable in the right ovary, the right side is more than 3 times the volume of the left and there is surrounding fluid  Technologist also notes that the patient is focally tender to   scanning of that side  Constellation of findings raises concern for partial or intermittent torsion    Recommend gynecology consultation      2   Bilateral polycystic ovarian morphology (PCOM) though imaging appearance, alone, is neither necessary nor sufficient to diagnose polycystic ovarian syndrome (PCOS)  Correlate for other clinical features meeting diagnostic criteria for PCOS  Final Result by Prerna Maza MD (01/59 7206)       1  Though normal arterial and venous waveforms are currently demonstrable in the right ovary, the right side is more than 3 times the volume of the left and there is surrounding fluid  Technologist also notes that the patient is focally tender to    scanning of that side  Constellation of findings raises concern for partial or intermittent torsion  Recommend gynecology consultation  2   Bilateral polycystic ovarian morphology (PCOM) though imaging appearance, alone, is neither necessary nor sufficient to diagnose polycystic ovarian syndrome (PCOS)  Correlate for other clinical features meeting diagnostic criteria for PCOS  I personally discussed this study with Mia Horn on 11/14/2022 at 7:32 PM                Workstation performed: [id number]         CT abdomen pelvis with contrast   Final Result by Jun Gomes MD (11/14 1756)      No acute inflammatory process  Workstation performed: [id number]                    Procedures  Procedures         ED Course  ED Course as of 11/14/22 2232   Mon Nov 14, 2022   1628 RBC, UA(!): 4-10   1820 Patient remains in significant pain  Will get US to r/o ovarian torsion  1944 Concern for intermittant torsion  Will discuss w GYN for transfer for obs  2030 Discussed this case with Dr Torrey Ac of OBGYN  He will not weigh in on this case given that this patient [facility]  Will reach out to someone from this service to care for this patient  SBIRT 20yo+    Flowsheet Row Most Recent Value   SBIRT (25 yo +)    In order to provide better care to our patients, we are screening all of our patients for alcohol and drug use  Would it be okay to ask you these screening questions?  Yes Filed at: 11/14/2022 1632   Initial Alcohol Screen: US AUDIT-C     1  How often do you have a drink containing alcohol? 0 Filed at: 11/14/2022 1632   2  How many drinks containing alcohol do you have on a typical day you are drinking? 0 Filed at: 11/14/2022 1632   3a  Male UNDER 65: How often do you have five or more drinks on one occasion? 0 Filed at: 11/14/2022 1632   3b  FEMALE Any Age, or MALE 65+: How often do you have 4 or more drinks on one occassion? 0 Filed at: 11/14/2022 1632   Audit-C Score 0 Filed at: 11/14/2022 8489   DEEPIKA: How many times in the past year have you    Used an illegal drug or used a prescription medication for non-medical reasons? Never Filed at: 11/14/2022 1632                    MDM  Number of Diagnoses or Management Options  Abdominal pain  Ovarian torsion  Diagnosis management comments: Abdominal pain, CT scan is normal   Concern for intermittent ovarian torsion  Patient is sent to [address] [place] [address] for [employer] evaluation  Amount and/or Complexity of Data Reviewed  Clinical lab tests: ordered and reviewed  Tests in the radiology section of CPT®: ordered and reviewed        Disposition  Final diagnoses:   Abdominal pain   Ovarian torsion     Time reflects when diagnosis was documented in both MDM as applicable and the Disposition within this note     Time User Action Codes Description Comment    11/14/2022 10:30 PM Shoshana Oms Add [R10 9] Abdominal pain     11/14/2022 10:30 PM Shoshana Oms Add [A72 302] Ovarian torsion       ED Disposition     ED Disposition   Transfer to Another Facility-In Network    Condition   --    Date/Time   Mon Nov 14, 2022  9:34 PM    Comment   [facility]             MD Documentation    Martha Portillo Most Recent Value   Patient Condition The patient has been stabilized such that within reasonable medical probability, no material deterioration of the patient condition or the condition of the unborn child(aashish) is likely to result from the transfer   Reason for Transfer Level of Care needed not available at this facility   Benefits of Transfer Specialized equipment and/or services available at the receiving facility (Include comment)________________________  [OBGYN]   Risks of Transfer Potential for delay in receiving treatment, Potential deterioration of medical condition, Loss of IV, Increased discomfort during transfer, Possible worsening of condition or death during transfer   Accepting Physician Joseluis Pinzon Name, [address] [place] [address]    (Name & Tel number) [place] and Unit #) Jeff Goldman   Sending MD [place]   Provider Certification General risk, such as traffic hazards, adverse weather conditions, rough terrain or turbulence, possible failure of equipment (including vehicle or aircraft), or consequences of actions of persons outside the control of the transport personnel, Unanticipated needs of medical equipment and personnel during transport, Risk of worsening condition, The possibility of a transport vehicle being unavailable      RN Documentation    Flowsheet Row Most [facility] Name, [address] [place] [address]    (Name & Tel number) Bong Meeks   Transported by [employer] and Unit #) JESSICA      Follow-up Information    None         Discharge Medication List as of 11/14/2022 10:08 PM      CONTINUE these medications which have NOT CHANGED    Details   albuterol (PROVENTIL HFA,VENTOLIN HFA) 90 mcg/act inhaler INHALE 2 PUFFS EVERY 6 HOURS AS NEEDED FOR WHEEZING, Normal      Cholecalciferol (Vitamin D3) 50 MCG (2000 UT) capsule TAKE 1 CAPSULE BY MOUTH EVERY DAY, Normal      etonogestrel (NEXPLANON) subdermal implant Inject 68 mg under the skin continuous, Historical Med      FLUoxetine (PROzac) 10 MG tablet Take 1 tablet (10 mg total) by mouth daily, Starting Fri 8/26/2022, Normal guaiFENesin (MUCINEX) 600 mg 12 hr tablet Take 1 tablet (600 mg total) by mouth every 12 (twelve) hours, Starting Thu 10/14/2021, Normal      loratadine (CLARITIN) 10 mg tablet Take 10 mg by mouth daily, Historical Med      montelukast (SINGULAIR) 10 mg tablet Take 1 tablet (10 mg total) by mouth daily at bedtime, Starting Thu 9/19/2019, Normal      pantoprazole (PROTONIX) 20 mg tablet TAKE 1 TABLET BY MOUTH DAILY BEFORE BREAKFAST , Normal             No discharge procedures on file      PDMP Review     None          ED Provider  Electronically Signed by           Nelson Hale DO  11/14/22 9345

## 2022-11-15 ENCOUNTER — ANESTHESIA EVENT (OUTPATIENT)
Dept: PERIOP | Facility: HOSPITAL | Age: 22
End: 2022-11-15

## 2022-11-15 ENCOUNTER — APPOINTMENT (OUTPATIENT)
Dept: ULTRASOUND IMAGING | Facility: HOSPITAL | Age: 22
End: 2022-11-15

## 2022-11-15 ENCOUNTER — ANESTHESIA (OUTPATIENT)
Dept: PERIOP | Facility: HOSPITAL | Age: 22
End: 2022-11-15

## 2022-11-15 VITALS
TEMPERATURE: 97.6 F | HEART RATE: 99 BPM | RESPIRATION RATE: 16 BRPM | WEIGHT: 213.19 LBS | BODY MASS INDEX: 34.94 KG/M2 | OXYGEN SATURATION: 96 % | DIASTOLIC BLOOD PRESSURE: 83 MMHG | SYSTOLIC BLOOD PRESSURE: 121 MMHG

## 2022-11-15 PROBLEM — Z00.00 ANNUAL PHYSICAL EXAM: Status: RESOLVED | Noted: 2022-08-26 | Resolved: 2022-11-15

## 2022-11-15 PROBLEM — Z01.419 ENCOUNTER FOR GYNECOLOGICAL EXAMINATION (GENERAL) (ROUTINE) WITHOUT ABNORMAL FINDINGS: Status: RESOLVED | Noted: 2019-07-17 | Resolved: 2022-11-15

## 2022-11-15 PROBLEM — N83.8 ENLARGED OVARY: Status: ACTIVE | Noted: 2022-11-15

## 2022-11-15 PROBLEM — N83.519 OVARIAN TORSION: Status: ACTIVE | Noted: 2022-11-15

## 2022-11-15 PROBLEM — Z01.419 WELL WOMAN EXAM: Status: RESOLVED | Noted: 2019-05-21 | Resolved: 2022-11-15

## 2022-11-15 PROBLEM — Z13.220 SCREENING FOR HYPERLIPIDEMIA: Status: RESOLVED | Noted: 2019-04-16 | Resolved: 2022-11-15

## 2022-11-15 PROBLEM — R68.89 FLU-LIKE SYMPTOMS: Status: RESOLVED | Noted: 2021-04-15 | Resolved: 2022-11-15

## 2022-11-15 PROBLEM — R10.2 PELVIC PAIN: Status: ACTIVE | Noted: 2022-11-15

## 2022-11-15 LAB
BASOPHILS # BLD AUTO: 0.06 THOUSANDS/ÂΜL (ref 0–0.1)
BASOPHILS NFR BLD AUTO: 1 % (ref 0–1)
EOSINOPHIL # BLD AUTO: 0.17 THOUSAND/ÂΜL (ref 0–0.61)
EOSINOPHIL NFR BLD AUTO: 2 % (ref 0–6)
ERYTHROCYTE [DISTWIDTH] IN BLOOD BY AUTOMATED COUNT: 12.7 % (ref 11.6–15.1)
HCT VFR BLD AUTO: 41.8 % (ref 34.8–46.1)
HGB BLD-MCNC: 13.7 G/DL (ref 11.5–15.4)
IMM GRANULOCYTES # BLD AUTO: 0.03 THOUSAND/UL (ref 0–0.2)
IMM GRANULOCYTES NFR BLD AUTO: 0 % (ref 0–2)
LYMPHOCYTES # BLD AUTO: 2.27 THOUSANDS/ÂΜL (ref 0.6–4.47)
LYMPHOCYTES NFR BLD AUTO: 25 % (ref 14–44)
MCH RBC QN AUTO: 29.7 PG (ref 26.8–34.3)
MCHC RBC AUTO-ENTMCNC: 32.8 G/DL (ref 31.4–37.4)
MCV RBC AUTO: 91 FL (ref 82–98)
MONOCYTES # BLD AUTO: 0.69 THOUSAND/ÂΜL (ref 0.17–1.22)
MONOCYTES NFR BLD AUTO: 8 % (ref 4–12)
NEUTROPHILS # BLD AUTO: 5.77 THOUSANDS/ÂΜL (ref 1.85–7.62)
NEUTS SEG NFR BLD AUTO: 64 % (ref 43–75)
NRBC BLD AUTO-RTO: 0 /100 WBCS
PLATELET # BLD AUTO: 336 THOUSANDS/UL (ref 149–390)
PMV BLD AUTO: 9.8 FL (ref 8.9–12.7)
RBC # BLD AUTO: 4.61 MILLION/UL (ref 3.81–5.12)
WBC # BLD AUTO: 8.99 THOUSAND/UL (ref 4.31–10.16)

## 2022-11-15 RX ORDER — LIDOCAINE HYDROCHLORIDE 10 MG/ML
INJECTION, SOLUTION EPIDURAL; INFILTRATION; INTRACAUDAL; PERINEURAL AS NEEDED
Status: DISCONTINUED | OUTPATIENT
Start: 2022-11-15 | End: 2022-11-15

## 2022-11-15 RX ORDER — OXYCODONE HYDROCHLORIDE 5 MG/1
5 TABLET ORAL EVERY 4 HOURS PRN
Qty: 5 TABLET | Refills: 0 | Status: SHIPPED | OUTPATIENT
Start: 2022-11-15 | End: 2022-11-25

## 2022-11-15 RX ORDER — ONDANSETRON 2 MG/ML
INJECTION INTRAMUSCULAR; INTRAVENOUS AS NEEDED
Status: DISCONTINUED | OUTPATIENT
Start: 2022-11-15 | End: 2022-11-15

## 2022-11-15 RX ORDER — OXYCODONE HYDROCHLORIDE 5 MG/1
5 TABLET ORAL EVERY 4 HOURS PRN
Status: DISCONTINUED | OUTPATIENT
Start: 2022-11-15 | End: 2022-11-15 | Stop reason: HOSPADM

## 2022-11-15 RX ORDER — ONDANSETRON 2 MG/ML
4 INJECTION INTRAMUSCULAR; INTRAVENOUS EVERY 6 HOURS PRN
Status: DISCONTINUED | OUTPATIENT
Start: 2022-11-15 | End: 2022-11-15 | Stop reason: HOSPADM

## 2022-11-15 RX ORDER — DOCUSATE SODIUM 100 MG/1
100 CAPSULE, LIQUID FILLED ORAL 2 TIMES DAILY
Status: DISCONTINUED | OUTPATIENT
Start: 2022-11-15 | End: 2022-11-15 | Stop reason: HOSPADM

## 2022-11-15 RX ORDER — BUPIVACAINE HYDROCHLORIDE AND EPINEPHRINE 2.5; 5 MG/ML; UG/ML
INJECTION, SOLUTION INFILTRATION; PERINEURAL AS NEEDED
Status: DISCONTINUED | OUTPATIENT
Start: 2022-11-15 | End: 2022-11-15 | Stop reason: HOSPADM

## 2022-11-15 RX ORDER — KETOROLAC TROMETHAMINE 30 MG/ML
INJECTION, SOLUTION INTRAMUSCULAR; INTRAVENOUS AS NEEDED
Status: DISCONTINUED | OUTPATIENT
Start: 2022-11-15 | End: 2022-11-15

## 2022-11-15 RX ORDER — CYCLOBENZAPRINE HCL 10 MG
10 TABLET ORAL ONCE
Status: COMPLETED | OUTPATIENT
Start: 2022-11-15 | End: 2022-11-15

## 2022-11-15 RX ORDER — ONDANSETRON 4 MG/1
4 TABLET, FILM COATED ORAL EVERY 8 HOURS PRN
Qty: 10 TABLET | Refills: 0 | Status: SHIPPED | OUTPATIENT
Start: 2022-11-15

## 2022-11-15 RX ORDER — ROCURONIUM BROMIDE 10 MG/ML
INJECTION, SOLUTION INTRAVENOUS AS NEEDED
Status: DISCONTINUED | OUTPATIENT
Start: 2022-11-15 | End: 2022-11-15

## 2022-11-15 RX ORDER — OXYCODONE HYDROCHLORIDE 10 MG/1
10 TABLET ORAL EVERY 4 HOURS PRN
Status: DISCONTINUED | OUTPATIENT
Start: 2022-11-15 | End: 2022-11-15 | Stop reason: HOSPADM

## 2022-11-15 RX ORDER — SODIUM CHLORIDE, SODIUM LACTATE, POTASSIUM CHLORIDE, CALCIUM CHLORIDE 600; 310; 30; 20 MG/100ML; MG/100ML; MG/100ML; MG/100ML
75 INJECTION, SOLUTION INTRAVENOUS CONTINUOUS
Status: DISCONTINUED | OUTPATIENT
Start: 2022-11-15 | End: 2022-11-15

## 2022-11-15 RX ORDER — SODIUM CHLORIDE, SODIUM LACTATE, POTASSIUM CHLORIDE, CALCIUM CHLORIDE 600; 310; 30; 20 MG/100ML; MG/100ML; MG/100ML; MG/100ML
INJECTION, SOLUTION INTRAVENOUS CONTINUOUS PRN
Status: DISCONTINUED | OUTPATIENT
Start: 2022-11-15 | End: 2022-11-15

## 2022-11-15 RX ORDER — KETOROLAC TROMETHAMINE 10 MG/1
10 TABLET, FILM COATED ORAL EVERY 6 HOURS PRN
Qty: 8 TABLET | Refills: 0 | Status: SHIPPED | OUTPATIENT
Start: 2022-11-15 | End: 2022-11-23 | Stop reason: ALTCHOICE

## 2022-11-15 RX ORDER — IBUPROFEN 600 MG/1
600 TABLET ORAL EVERY 6 HOURS
Status: DISCONTINUED | OUTPATIENT
Start: 2022-11-15 | End: 2022-11-15

## 2022-11-15 RX ORDER — PROPOFOL 10 MG/ML
INJECTION, EMULSION INTRAVENOUS CONTINUOUS PRN
Status: DISCONTINUED | OUTPATIENT
Start: 2022-11-15 | End: 2022-11-15

## 2022-11-15 RX ORDER — ACETAMINOPHEN 325 MG/1
975 TABLET ORAL EVERY 6 HOURS
Status: DISCONTINUED | OUTPATIENT
Start: 2022-11-15 | End: 2022-11-15 | Stop reason: HOSPADM

## 2022-11-15 RX ORDER — MIDAZOLAM HYDROCHLORIDE 2 MG/2ML
INJECTION, SOLUTION INTRAMUSCULAR; INTRAVENOUS AS NEEDED
Status: DISCONTINUED | OUTPATIENT
Start: 2022-11-15 | End: 2022-11-15

## 2022-11-15 RX ORDER — DEXAMETHASONE SODIUM PHOSPHATE 10 MG/ML
INJECTION, SOLUTION INTRAMUSCULAR; INTRAVENOUS AS NEEDED
Status: DISCONTINUED | OUTPATIENT
Start: 2022-11-15 | End: 2022-11-15

## 2022-11-15 RX ORDER — HYDROMORPHONE HCL/PF 1 MG/ML
0.5 SYRINGE (ML) INJECTION
Status: DISCONTINUED | OUTPATIENT
Start: 2022-11-15 | End: 2022-11-15 | Stop reason: HOSPADM

## 2022-11-15 RX ORDER — FENTANYL CITRATE 50 UG/ML
INJECTION, SOLUTION INTRAMUSCULAR; INTRAVENOUS AS NEEDED
Status: DISCONTINUED | OUTPATIENT
Start: 2022-11-15 | End: 2022-11-15

## 2022-11-15 RX ORDER — MAGNESIUM HYDROXIDE 1200 MG/15ML
LIQUID ORAL AS NEEDED
Status: DISCONTINUED | OUTPATIENT
Start: 2022-11-15 | End: 2022-11-15 | Stop reason: HOSPADM

## 2022-11-15 RX ORDER — NEOSTIGMINE METHYLSULFATE 1 MG/ML
INJECTION INTRAVENOUS AS NEEDED
Status: DISCONTINUED | OUTPATIENT
Start: 2022-11-15 | End: 2022-11-15

## 2022-11-15 RX ORDER — HYDROMORPHONE HCL/PF 1 MG/ML
0.5 SYRINGE (ML) INJECTION ONCE
Status: COMPLETED | OUTPATIENT
Start: 2022-11-15 | End: 2022-11-15

## 2022-11-15 RX ORDER — DEXAMETHASONE SODIUM PHOSPHATE 4 MG/ML
4 INJECTION, SOLUTION INTRA-ARTICULAR; INTRALESIONAL; INTRAMUSCULAR; INTRAVENOUS; SOFT TISSUE ONCE AS NEEDED
Status: DISCONTINUED | OUTPATIENT
Start: 2022-11-15 | End: 2022-11-15 | Stop reason: HOSPADM

## 2022-11-15 RX ORDER — ACETAMINOPHEN 325 MG/1
975 TABLET ORAL EVERY 6 HOURS
Refills: 0
Start: 2022-11-15

## 2022-11-15 RX ORDER — ONDANSETRON 2 MG/ML
4 INJECTION INTRAMUSCULAR; INTRAVENOUS ONCE AS NEEDED
Status: DISCONTINUED | OUTPATIENT
Start: 2022-11-15 | End: 2022-11-15 | Stop reason: HOSPADM

## 2022-11-15 RX ORDER — IBUPROFEN 600 MG/1
600 TABLET ORAL EVERY 6 HOURS PRN
Status: DISCONTINUED | OUTPATIENT
Start: 2022-11-15 | End: 2022-11-15 | Stop reason: HOSPADM

## 2022-11-15 RX ORDER — FENTANYL CITRATE/PF 50 MCG/ML
25 SYRINGE (ML) INJECTION
Status: DISCONTINUED | OUTPATIENT
Start: 2022-11-15 | End: 2022-11-15 | Stop reason: HOSPADM

## 2022-11-15 RX ORDER — GLYCOPYRROLATE 0.2 MG/ML
INJECTION INTRAMUSCULAR; INTRAVENOUS AS NEEDED
Status: DISCONTINUED | OUTPATIENT
Start: 2022-11-15 | End: 2022-11-15

## 2022-11-15 RX ORDER — SODIUM CHLORIDE, SODIUM LACTATE, POTASSIUM CHLORIDE, CALCIUM CHLORIDE 600; 310; 30; 20 MG/100ML; MG/100ML; MG/100ML; MG/100ML
125 INJECTION, SOLUTION INTRAVENOUS CONTINUOUS
Status: DISCONTINUED | OUTPATIENT
Start: 2022-11-15 | End: 2022-11-15 | Stop reason: HOSPADM

## 2022-11-15 RX ORDER — PROPOFOL 10 MG/ML
INJECTION, EMULSION INTRAVENOUS AS NEEDED
Status: DISCONTINUED | OUTPATIENT
Start: 2022-11-15 | End: 2022-11-15

## 2022-11-15 RX ADMIN — MIDAZOLAM HYDROCHLORIDE 2 MG: 1 INJECTION, SOLUTION INTRAMUSCULAR; INTRAVENOUS at 11:50

## 2022-11-15 RX ADMIN — DEXTROSE AND SODIUM CHLORIDE 125 ML/HR: 5; .45 INJECTION, SOLUTION INTRAVENOUS at 02:54

## 2022-11-15 RX ADMIN — KETOROLAC TROMETHAMINE 30 MG: 30 INJECTION, SOLUTION INTRAMUSCULAR at 13:07

## 2022-11-15 RX ADMIN — FENTANYL CITRATE 25 MCG: 50 INJECTION, SOLUTION INTRAMUSCULAR; INTRAVENOUS at 12:46

## 2022-11-15 RX ADMIN — HYDROMORPHONE HYDROCHLORIDE 0.5 MG: 1 INJECTION, SOLUTION INTRAMUSCULAR; INTRAVENOUS; SUBCUTANEOUS at 03:47

## 2022-11-15 RX ADMIN — SODIUM CHLORIDE, SODIUM LACTATE, POTASSIUM CHLORIDE, AND CALCIUM CHLORIDE: .6; .31; .03; .02 INJECTION, SOLUTION INTRAVENOUS at 12:00

## 2022-11-15 RX ADMIN — ROCURONIUM BROMIDE 30 MG: 10 INJECTION, SOLUTION INTRAVENOUS at 12:05

## 2022-11-15 RX ADMIN — FENTANYL CITRATE 50 MCG: 50 INJECTION, SOLUTION INTRAMUSCULAR; INTRAVENOUS at 12:41

## 2022-11-15 RX ADMIN — LIDOCAINE HYDROCHLORIDE 50 MG: 10 INJECTION, SOLUTION EPIDURAL; INFILTRATION; INTRACAUDAL at 12:05

## 2022-11-15 RX ADMIN — NEOSTIGMINE METHYLSULFATE 4 MG: 1 INJECTION INTRAVENOUS at 13:08

## 2022-11-15 RX ADMIN — ROCURONIUM BROMIDE 20 MG: 10 INJECTION, SOLUTION INTRAVENOUS at 12:42

## 2022-11-15 RX ADMIN — GLYCOPYRROLATE 0.8 MG: 0.2 INJECTION, SOLUTION INTRAMUSCULAR; INTRAVENOUS at 13:08

## 2022-11-15 RX ADMIN — OXYCODONE HYDROCHLORIDE 10 MG: 10 TABLET ORAL at 00:23

## 2022-11-15 RX ADMIN — FENTANYL CITRATE 50 MCG: 50 INJECTION, SOLUTION INTRAMUSCULAR; INTRAVENOUS at 12:12

## 2022-11-15 RX ADMIN — MIDAZOLAM HYDROCHLORIDE 2 MG: 1 INJECTION, SOLUTION INTRAMUSCULAR; INTRAVENOUS at 12:02

## 2022-11-15 RX ADMIN — FENTANYL CITRATE 50 MCG: 50 INJECTION, SOLUTION INTRAMUSCULAR; INTRAVENOUS at 12:05

## 2022-11-15 RX ADMIN — CYCLOBENZAPRINE 10 MG: 10 TABLET, FILM COATED ORAL at 03:47

## 2022-11-15 RX ADMIN — PROPOFOL 200 MG: 10 INJECTION, EMULSION INTRAVENOUS at 12:05

## 2022-11-15 RX ADMIN — ACETAMINOPHEN 650 MG: 325 TABLET ORAL at 09:45

## 2022-11-15 RX ADMIN — ONDANSETRON 4 MG: 2 INJECTION INTRAMUSCULAR; INTRAVENOUS at 09:39

## 2022-11-15 RX ADMIN — ONDANSETRON 4 MG: 2 INJECTION INTRAMUSCULAR; INTRAVENOUS at 12:38

## 2022-11-15 RX ADMIN — ROCURONIUM BROMIDE 10 MG: 10 INJECTION, SOLUTION INTRAVENOUS at 12:46

## 2022-11-15 RX ADMIN — ACETAMINOPHEN 975 MG: 325 TABLET ORAL at 16:51

## 2022-11-15 RX ADMIN — OXYCODONE HYDROCHLORIDE 5 MG: 5 TABLET ORAL at 10:53

## 2022-11-15 RX ADMIN — FENTANYL CITRATE 25 MCG: 50 INJECTION, SOLUTION INTRAMUSCULAR; INTRAVENOUS at 12:22

## 2022-11-15 RX ADMIN — DEXAMETHASONE SODIUM PHOSPHATE 10 MG: 10 INJECTION, SOLUTION INTRAMUSCULAR; INTRAVENOUS at 12:38

## 2022-11-15 RX ADMIN — DOCUSATE SODIUM 100 MG: 100 CAPSULE, LIQUID FILLED ORAL at 16:52

## 2022-11-15 RX ADMIN — PROPOFOL 130 MCG/KG/MIN: 10 INJECTION, EMULSION INTRAVENOUS at 12:05

## 2022-11-15 RX ADMIN — SODIUM CHLORIDE, SODIUM LACTATE, POTASSIUM CHLORIDE, CALCIUM CHLORIDE: 600; 310; 30; 20 INJECTION, SOLUTION INTRAVENOUS at 12:13

## 2022-11-15 NOTE — ED NOTES
Rufina Mckeon([facility])  head, inga 1/21/98 ed mo 4  pickup slets 2130  to [facility] private dr Tay [facility] [place] obn   report [phone] -thanks pac     Natalia Burns RN  11/14/22 2134

## 2022-11-15 NOTE — ANESTHESIA PREPROCEDURE EVALUATION
Procedure:  LAPAROSCOPY DIAGNOSTIC, detorsion of right ovary (Right Abdomen)    Relevant Problems   NEURO/PSYCH   (+) Dysthymia      PULMONARY   (+) Mild intermittent asthma with acute exacerbation    BMI 35    Physical Exam    Airway    Mallampati score: III  TM Distance: >3 FB  Neck ROM: full     Dental   No notable dental hx     Cardiovascular      Pulmonary      Other Findings        Anesthesia Plan  ASA Score- 2     Anesthesia Type- general with ASA Monitors  Additional Monitors:   Airway Plan: ETT  Plan Factors-Exercise tolerance (METS): >4 METS  Chart reviewed  Imaging results reviewed  Existing labs reviewed  Patient summary reviewed  Patient is not a current smoker  Induction- intravenous  Postoperative Plan- Plan for postoperative opioid use  Planned trial extubation    Informed Consent- Anesthetic plan and risks discussed with patient  I personally reviewed this patient with the CRNA  Discussed and agreed on the Anesthesia Plan with the CRNA  Niraj Almonte

## 2022-11-15 NOTE — ASSESSMENT & PLAN NOTE
Likely secondary to ruptured ovarian cyst vs  Ovarian torsion  Ultrasound findings normal for Kidney/Bladder scan as follows this morning:   KIDNEYS:  Symmetric and normal size  Right kidney:  10 8 x 4 5 x 4 7 cm  Volume 118 7 mL  Left kidney:  12 1 x 6 1 x 5 6 cm  Volume 214 2 mL     Right kidney  Normal echogenicity and contour  No mass is identified  No hydronephrosis  No shadowing calculi  No perinephric fluid collections      Left kidney  Normal echogenicity and contour  No mass is identified  No hydronephrosis  No shadowing calculi  No perinephric fluid collections      URETERS:  Nonvisualized      BLADDER:   Normally distended  No focal thickening or mass lesions    Bilateral ureteral jets detected        IMPRESSION:     Normal

## 2022-11-15 NOTE — PROGRESS NOTES
Vitals:    11/14/22 2300 11/15/22 0100 11/15/22 0226 11/15/22 0916   BP: 132/75 127/76 139/87 118/82   BP Location:    Left arm   Pulse: 83 79 83 78   Resp: 18 18 16   Temp:    98 6 °F (37 °C)   TempSrc:    Oral   SpO2: 96% 96% 95% 98%   Weight:           Evaluated patient in later morning  Ultrasound not significantly different from prior in terms of size of ovaries or free fluid, but with new potentially mildly diminished venous flow compared to the left side with a whirled appearance  At the time, the patient had no abdominal pain and felt well overall  After returning from ultrasound, she continued to have no pain and felt well, and discharge home was discussed  However, when patient showering, sudden onset of pain on right side that was 8/10, received oxycodone  Given ongoing pain that comes in waves as well as imaging finding concerning for torsion, plan to proceed with diagnostic laparoscopy to evaluate for cause of pain  Patient consented by Dr Constantin Barrera for diagnostic laparoscopy, possible ovarian cystectomy, possible oophorectomy, and all other indicated procedures  All questions answered  Will proceed to OR      Patient evaluated with Dr Miguel Roblero MD  Ob/Gyn R-4  11/15/22 11:18 AM

## 2022-11-15 NOTE — PROGRESS NOTES
Progress Note - OB/GYN   [facility] 23 y o  female MRN: [id number]  Unit/Bed#: OR POOL Encounter: [id number]    Assessment:  23 y o  G0 with bilateral polycystic ovary with right ovary larger than left ovary and concern for possible ovarian torsion vs  ruptured cyst, improving and appropriate now for discharge home with strict return precautions  Plan:  Pelvic pain  Assessment & Plan  Likely secondary to ruptured ovarian cyst vs  Ovarian torsion  Ultrasound findings normal for Kidney/Bladder scan as follows this morning:   KIDNEYS:  Symmetric and normal size  Right kidney:  10 8 x 4 5 x 4 7 cm  Volume 118 7 mL  Left kidney:  12 1 x 6 1 x 5 6 cm  Volume 214 2 mL     Right kidney  Normal echogenicity and contour  No mass is identified  No hydronephrosis  No shadowing calculi  No perinephric fluid collections      Left kidney  Normal echogenicity and contour  No mass is identified  No hydronephrosis  No shadowing calculi  No perinephric fluid collections      URETERS:  Nonvisualized      BLADDER:   Normally distended  No focal thickening or mass lesions  Bilateral ureteral jets detected        IMPRESSION:     Normal      * Enlarged ovary  Assessment & Plan  · Patient reports that pain has significantly improved from yesterday  Required narcotic administration overnight but subsequently has only required Tylenol  Lab Results   Component Value Date    HGB 13 7 11/15/2022    HGB 14 5 11/14/2022    WBC 8 99 11/15/2022    WBC 13 90 (H) 11/14/2022     · WBC improved overnight  · CMP within normal limits, UA with hematuria, pregnancy test negative  · CT scan without acute inflammatory process, pelvic ultrasound with evidence of "Right ovary:  4 7 x 3 8 x 4 0 cm  37 8 mL No suspicious right ovarian abnormality  Numerous peripheralized microfollicles  Doppler flow within normal limits  Normal arterial and venous spectral waveforms   Left ovary:  3 6 x 2 4 x 2 7 cm  12 3 mL No suspicious left ovarian abnormality  Numerous peripheralized microfollicles  Doppler flow within normal limits  Normal arterial and venous spectral waveforms, right side is more than 3 times the volume of the left and there is surrounding fluid "  · Repeat US this morning showed:   · UTERUS:  · Retroflexed  6 3 x 3 2  x 3 9 cm (length by depth by width)  · Normal echotexture and morphology  · Cervix is within normal limits  · ENDOMETRIUM:    · AP caliber of 3 0 mm  · Normal thickness and echotexture     · OVARIES/ADNEXA:  · Right ovary:  4 6 x 2 7 x 3 7 cm  24 3 mL  Mildly enlarged  Approximately 3 times larger than the left ovarian volume, which is upper limits of normal   · Small and prominently peripheral peripheral   Normal, symmetric echotexture compared to the left  · Arterial and venous flow obtained  Mildly diminished venous flow compared to the left  There is a whirled appearance of the vascular pedicle  · Left ovary:  3 1 x 2 3 x 2 4 cm  8 9 mL  · No suspicious left ovarian abnormality  Also numerous small follicles, predominantly peripheral   · Doppler flow within normal limits  · No suspicious adnexal mass or loculated collections  · Small free fluid which is typically physiologic  · We reviewed all imaging in detail including possibility of torsion and inability to completely rule out torsion based on ultrasound alone  Given her improvement in symptoms and benign abdominal exam, will honor patient's wishes to avoid surgery at this time  We reviewed that we cannot be 586% certain that her ovary is not torsed or at least partially or intermittently torsing  We reviewed that it is also possible that her pain and improvement is secondary to ruptured ovarian cyst    · We reviewed that with a ruptured ovarian cyst, there is no immediately need for surgical intervention  We discussed that her pain should improve with time and continue pain management   She is aware that we should discuss prevention of cysts in the office  She wishes to have her Nexplanon removed  There is concern for new diagnosis of PCOS  · For expectant management for possible ovarian torsion, there is a risk of continued decreased blood flow possibly leading to death of the ovary  There is possibility that the condition may worsen and pain would return  She is aware to immediately call and office and return to the ED for further evaluation if pain worsens or she has significant nausea and vomiting without relief  · Patient has requested discharge home at this time  Will send Toradol and Zofran to the pharmacy of her choice  Counseled on short term use and avoiding additional NSAIDs with Toradol  · Patient is hungry and would like to eat at this time  Will order regular diet given that decision has been made against surgical management  · Pain: Tylenol, chanda 5 ordered         Subjective/Objective   Armaan Lazaro reports that her pain has significantly improved from yesterday/ last night  She has mildly increased pain after her scans this morning but was able to ambulate to the bathroom and wheelchair without difficulty  She did have an episode of emesis this morning but she thinks this is secondary to movement, scan, and NPO status with narcotic administration  She has been voiding without difficulty  She is hungry and would like to eat  Objective:     Vitals: Blood pressure 118/82, pulse 78, temperature 98 6 °F (37 °C), temperature source Oral, resp  rate 16, weight 96 7 kg (213 lb 3 oz), last menstrual period 10/31/2022, SpO2 98 %, not currently breastfeeding  Physical Exam:     Physical Exam  Vitals reviewed  Exam conducted with a chaperone present  Constitutional:       General: She is not in acute distress  Appearance: She is not ill-appearing, toxic-appearing or diaphoretic  Cardiovascular:      Rate and Rhythm: Normal rate  Pulmonary:      Effort: Pulmonary effort is normal  No respiratory distress     Abdominal:      General: There is no distension  Palpations: Abdomen is soft  There is no mass  Tenderness: There is abdominal tenderness (Mild tenderness in RLQ )  There is no guarding or rebound  Skin:     General: Skin is warm and dry  Neurological:      Mental Status: She is alert and oriented to person, place, and time  Mental status is at baseline  Psychiatric:         Mood and Affect: Mood normal          Behavior: Behavior normal          Thought Content:  Thought content normal          Judgment: Judgment normal          Lab, Imaging and other studies: I have personally reviewed pertinent films in PACS    Lab Results   Component Value Date    WBC 8 99 11/15/2022    HGB 13 7 11/15/2022    HCT 41 8 11/15/2022    MCV 91 11/15/2022     11/15/2022               Nj Amaya MD  11/15/22

## 2022-11-15 NOTE — PROGRESS NOTES
Evaluated patient postoperatively  No pain, tolerating PO, ambulating, and desires to be discharged      Will discharge patient now to follow up with [facility]    Discussed with Dr John Patel MD  Ob/Gyn R-4  11/15/22 4:21 PM

## 2022-11-15 NOTE — QUICK NOTE
Matt Mean Head 23 y o  admitted for observation of RLQ pain c/f intermittent ovarian torsion      Reviewed concern that after last dose of dilaudid still uncomfortable  On contrary she and her  feel this was the first couple of hours she was able to rest and finally feels a bit better     /87   Pulse 83   Temp 98 3 °F (36 8 °C) (Oral)   Resp 18   Wt 96 7 kg (213 lb 3 oz)   LMP 10/31/2022   SpO2 95%   BMI 34 94 kg/m²    Gen: resting comfortably, no acute distress  Pulm: normal respiratory effort   CV: regular rate and rhythm  Abd: soft, non-distended, mod ttp in RLQ, no rebound or guarding, able to sit up without difficulty  Ext: full range of motion     · Reviewed that if requiring multiple doses of IV pain medicine is concerning for possible intermittent or persistent torsion  · Reviewed that surgery could be diagnostic in a way that imaging would not be  · Reviewed that concern with delay of action is possible loss of ovarian function  · Reviewed that on occasion pain can be persistent and then after a few doses of IV pain medication the pain slowly starts easing up, and since she feels this is the first time she was able to rest, after discussing with her  and mom she indicates she strongly prefers to try and rest and reassess later in AM  · Still strongly hoping to avoid surgery  · Understands limitations of ultrasound, but she still strongly prefers repeat imaging to help guide decision making  · Surgical consent left at bedside   · Plan to rest now and reassess later in AM, if pain worsens she will call out sooner in order to move forward with surgery sooner if needed    Calvin Sacks, MD   11/15/22   5:11 AM

## 2022-11-15 NOTE — DISCHARGE INSTR - AVS FIRST PAGE
Summary of your hospitalization: We were able to do a surgery to un-twist your right ovary  The surgery was uncomplicated and there were no immediate complications  Please call the office for an appointment outpatient (in the next 2-3 months) for follow up and discussion regarding cyst prevention and PCOS  We can also discuss removing the Nexplanon at that time as requested      Take care,    ~Dr Oliva Banda

## 2022-11-15 NOTE — PLAN OF CARE
Problem: GASTROINTESTINAL - ADULT  Goal: Minimal or absence of nausea and/or vomiting  Description: INTERVENTIONS:  - Administer IV fluids if ordered to ensure adequate hydration  - Maintain PO status until nausea and vomiting are resolved  - Nasogastric tube if ordered  - Administer ordered antiemetic medications as needed  - Provide nonpharmacologic comfort measures as appropriate  - Advance diet as tolerated, if ordered  - Consider nutrition services referral to assist patient with adequate nutrition and appropriate food choices  Outcome: Progressing  Goal: Maintains or returns to baseline bowel function  Description: INTERVENTIONS:  - Assess bowel function  - Encourage oral fluids to ensure adequate hydration  - Administer IV fluids if ordered to ensure adequate hydration  - Administer ordered medications as needed  - Encourage mobilization and activity  - Consider nutritional services referral to assist patient with adequate nutrition and appropriate food choices  Outcome: Progressing  Goal: Maintains adequate nutritional intake  Description: INTERVENTIONS:  - Monitor percentage of each meal consumed  - Identify factors contributing to decreased intake, treat as appropriate  - Assist with meals as needed  - Monitor I&O, weight, and lab values if indicated  - Obtain nutrition services referral as needed  Outcome: Progressing  Goal: Establish and maintain optimal ostomy function  Description: INTERVENTIONS:  - Assess bowel function  - Encourage oral fluids to ensure adequate hydration  - Administer IV fluids if ordered to ensure adequate hydration   - Administer ordered medications as needed  - Encourage mobilization and activity  - Nutrition services referral to assist patient with appropriate food choices  - Assess stoma site  - Consider wound care consult   Outcome: Progressing  Goal: Oral mucous membranes remain intact  Description: INTERVENTIONS  - Assess oral mucosa and hygiene practices  - Implement preventative oral hygiene regimen  - Implement oral medicated treatments as ordered  - Initiate Nutrition services referral as needed  Outcome: Progressing     Problem: GENITOURINARY - ADULT  Goal: Maintains or returns to baseline urinary function  Description: INTERVENTIONS:  - Assess urinary function  - Encourage oral fluids to ensure adequate hydration if ordered  - Administer IV fluids as ordered to ensure adequate hydration  - Administer ordered medications as needed  - Offer frequent toileting  - Follow urinary retention protocol if ordered  Outcome: Progressing  Goal: Absence of urinary retention  Description: INTERVENTIONS:  - Assess patient’s ability to void and empty bladder  - Monitor I/O  - Bladder scan as needed  - Discuss with physician/AP medications to alleviate retention as needed  - Discuss catheterization for long term situations as appropriate  Outcome: Progressing  Goal: Urinary catheter remains patent  Description: INTERVENTIONS:  - Assess patency of urinary catheter  - If patient has a chronic graf, consider changing catheter if non-functioning  - Follow guidelines for intermittent irrigation of non-functioning urinary catheter  Outcome: Progressing     Problem: METABOLIC, FLUID AND ELECTROLYTES - ADULT  Goal: Electrolytes maintained within normal limits  Description: INTERVENTIONS:  - Monitor labs and assess patient for signs and symptoms of electrolyte imbalances  - Administer electrolyte replacement as ordered  - Monitor response to electrolyte replacements, including repeat lab results as appropriate  - Instruct patient on fluid and nutrition as appropriate  Outcome: Progressing  Goal: Fluid balance maintained  Description: INTERVENTIONS:  - Monitor labs   - Monitor I/O and WT  - Instruct patient on fluid and nutrition as appropriate  - Assess for signs & symptoms of volume excess or deficit  Outcome: Progressing  Goal: Glucose maintained within target range  Description: INTERVENTIONS:  - Monitor Blood Glucose as ordered  - Assess for signs and symptoms of hyperglycemia and hypoglycemia  - Administer ordered medications to maintain glucose within target range  - Assess nutritional intake and initiate nutrition service referral as needed  Outcome: Progressing

## 2022-11-15 NOTE — ED NOTES
FROM: [facility] FT 04-FT 04 ([place])  TO: --  DX: --  EMS Tx Reason:   Transfer Priority Level (1,2,3):   Referring: Prerna Silva DO  Accepting:   Transport (ALS/BLS, etc):  Reason for ALS/CCT if applicable (O2, tele, IVF, meds):  P/U Time: TBD  Number for Report:     Michael Vanessa RN  11/14/22 2031

## 2022-11-15 NOTE — H&P
H&P Exam - Gynecology   [facility] 23 y o  female MRN: [id number]  Unit/Bed#: W -01 Encounter: [id number]        Assessment/Plan   * Enlarged ovary  Assessment & Plan  · Hemoglobin of 14 5, WBC 13 9, CMP within normal limits, UA with hematuria, pregnancy test negative  · CT scan without acute inflammatory process, pelvic ultrasound with evidence of "Right ovary:  4 7 x 3 8 x 4 0 cm  37 8 mL No suspicious right ovarian abnormality  Numerous peripheralized microfollicles  Doppler flow within normal limits  Normal arterial and venous spectral waveforms  Left ovary:  3 6 x 2 4 x 2 7 cm  12 3 mL No suspicious left ovarian abnormality  Numerous peripheralized microfollicles  Doppler flow within normal limits  Normal arterial and venous spectral waveforms, right side is more than 3 times the volume of the left and there is surrounding fluid "  · Patient states significant pain that just responded to dilaudid in [facility]  She states morphine or Toradol did not help her  At time of our evaluation patient with mild soreness on RLQ, no peritoneal signs present  No motion tenderness  · Patient admitted for observation , will reevaluate during the night for new onset of pain  · NPO IV fluids ordered  · Pain: Tylenol, chanda 5/10 ordered           History of Present Illness     HPI:  [facility] is a 23 y o  female who in transferred from [facility] due to  Right lower flank pain     Patient describe constant pain in RLQ that irradiates to her low extremity, patient states episodes of excruciating pain, stabbing in nature, associated to vaginal bleeding nausea and vomit  Patient states irregular bleeding since nexplanon placement 3 years ago  Patient denies past medical history of STDs or abdominal surgery  Last meal intake yesterday 7:00 p m  Patient presented to the ED tachycardic,  /98, normal O2 saturations on room air    Initial labs with hemoglobin of 14 5, WBC 13 9, CMP within normal limits, UA with hematuria, pregnancy test is negative  CT scan without acute inflammatory present  Pelvic ultrasound with evidence of "Right ovary:  4 7 x 3 8 x 4 0 cm  37 8 mL No suspicious right ovarian abnormality  Numerous peripheralized microfollicles  Doppler flow within normal limits  Normal arterial and venous spectral waveforms  Left ovary:  3 6 x 2 4 x 2 7 cm  12 3 mL No suspicious left ovarian abnormality  Numerous peripheralized microfollicles  Doppler flow within normal limits  Normal arterial and venous spectral waveforms, right side is more than 3 times the volume of the left and there is surrounding fluid "    Oncology History    No history exists  Review of Systems   Constitutional: Negative  HENT: Negative  Eyes: Negative  Respiratory: Negative  Cardiovascular: Negative  Gastrointestinal: Negative  Endocrine: Negative  Genitourinary: Negative  Musculoskeletal: Negative  Allergic/Immunologic: Negative  Neurological: Negative  Hematological: Negative  Psychiatric/Behavioral: Negative          Historical Information   Past Medical History:   Diagnosis Date   • Asthma    • Dysthymia 5/15/2019     Past Surgical History:   Procedure Laterality Date   • NO PAST SURGERIES       OB History    Para Term  AB Living   0 0 0 0 0 0   SAB IAB Ectopic Multiple Live Births   0 0 0 0 0     Family History   Problem Relation Age of Onset   • Breast cancer Other    • No Known Problems Mother    • No Known Problems Father      Social History   Social History     Substance and Sexual Activity   Alcohol Use No     Social History     Substance and Sexual Activity   Drug Use No     Social History     Tobacco Use   Smoking Status Never Smoker   Smokeless Tobacco Never Used       Meds/Allergies   Medications Prior to Admission   Medication   • albuterol (PROVENTIL HFA,VENTOLIN HFA) 90 mcg/act inhaler   • Cholecalciferol (Vitamin D3) 50 MCG (2000 UT) capsule   • etonogestrel (NEXPLANON) subdermal implant   • FLUoxetine (PROzac) 10 MG tablet   • guaiFENesin (MUCINEX) 600 mg 12 hr tablet   • loratadine (CLARITIN) 10 mg tablet   • montelukast (SINGULAIR) 10 mg tablet   • pantoprazole (PROTONIX) 20 mg tablet     No Known Allergies    Objective     /87   Pulse 83   Temp 98 3 °F (36 8 °C) (Oral)   Resp 18   Wt 96 7 kg (213 lb 3 oz)   LMP 10/31/2022   SpO2 95%   BMI 34 94 kg/m²     No intake/output data recorded  No intake/output data recorded  Lab Results   Component Value Date    WBC 13 90 (H) 11/14/2022    HGB 14 5 11/14/2022    HCT 44 5 11/14/2022    MCV 92 11/14/2022     11/14/2022       Lab Results   Component Value Date    CALCIUM 9 8 11/14/2022    K 4 2 11/14/2022    CO2 23 11/14/2022     11/14/2022    BUN 12 11/14/2022    CREATININE 0 63 11/14/2022       Physical Exam  Constitutional:       Appearance: She is well-developed  HENT:      Head: Normocephalic and atraumatic  Eyes:      Conjunctiva/sclera: Conjunctivae normal       Pupils: Pupils are equal, round, and reactive to light  Cardiovascular:      Rate and Rhythm: Normal rate and regular rhythm  Heart sounds: Normal heart sounds  Pulmonary:      Effort: Pulmonary effort is normal       Breath sounds: Normal breath sounds  Abdominal:      General: Bowel sounds are normal       Palpations: Abdomen is soft  Genitourinary:     Vagina: Normal    Musculoskeletal:         General: Normal range of motion  Cervical back: Normal range of motion and neck supple  Skin:     General: Skin is warm  Neurological:      Mental Status: She is alert and oriented to person, place, and time  Imaging: I have personally reviewed pertinent reports  EKG, Pathology, and Other Studies: I have personally reviewed pertinent reports          Code Status: Level 1 - Full Code    Tracee Burger MD  11/15/2022  4:10 AM

## 2022-11-15 NOTE — OP NOTE
OPERATIVE REPORT  PATIENT NAME: Cullen Levy    :  2000  MRN: [id number]  Pt Location: AN OR ROOM 01    SURGERY DATE: 11/15/2022    Surgeon(s) and Role:     Cici Duran MD - Primary     * Breanne García MD - Assisting    Preop Diagnosis:  Ovarian torsion [N83 519]    Post-Op Diagnosis Codes:     * Ovarian torsion [N83 519]    Procedure(s) (LRB):  LAPAROSCOPY DIAGNOSTIC, detorsion of right ovary (Right)    Specimen(s):  None    Estimated Blood Loss:   Minimal    Drains:  [REMOVED] Urethral Catheter Latex 16 Fr  (Removed)   Number of days: 0     Anesthesia Type:   General ET    Operative Indications:  Ovarian torsion [N83 519]    Operative Findings:  1  External genitalia grossly normal in appearance  No ulcerations, no lacerations, no lesions  Bimanual exam revealed small anteverted uterus with normal contours and freely mobile  No adnexal masses palpated bilaterally  2   Laparoscopic evaluation revealed no injury to bowel or bladder vasculature upon entrance  Uterus was small and anteverted  Adhesions were not present  3  Right ovarian pedicle was noted to be torsed with right fallopian tube  Mildly dusky on initial appearance but pink color returned after detorsion  Right fallopian tube mildly edematous  Right ovary was enlarged but without discrete single cyst    4  Left ovary enlarged but without evidence of discrete single cyst  Left fallopian tube normal    5  Distended bowels consistent with constipation  6   Upper abdominal organs were visualized and grossly normal in appearance  These include liver, stomach, and omentum  Complications:   None apparent    Procedure and Technique:  Description of Procedure    Patient was taken to the operating room  General endotracheal anesthesia (GET) was administered and the patient was positioned on the OR table in the dorsal lithotomy position   All pressure points were padded and a parveen hugger was placed to maintain control of core body temperature  A bimanual exam was performed and the uterus was noted to be anteverted, normal in size and consistency with no palpable adnexal masses or fullness  The patient was prepped and draped in the usual sterile fashion with chloroprep on the abdomen and chlorhexidine prep on the vagina and perineum  Operative Technique    A time out was performed to confirm correct patient and correct procedure  A Pagan catheter was introduced into the bladder  Sterile gloves were then exchanged and attention was turned to the abdomen  A 5mm incision was made at the superior edge of the umbilicus for introduction of a 5mm trocar  Trocar was introduced under direct visualization  Pneumoperitoneum was then established to a maximum of 15mmHg  The entire abdomen and pelvis was inspected and there was no evidence of injury to bowel, bladder, vasculature, or other structures  Attention was then turned to the pelvis  Patient was placed in Trendelenburg and the findings were as noted above  One additional port site was selected right lower abdomen approximately 2cm superior and medial to the iliac crests  A 5mm incision was made for introduction of a 5mm trocar under direct visualization at each site  A blunt grasper was inserted through this port and used detorse the right ovary and fallopian tube  Pneumoperitoneum was allowed to escape  Adequate hemostasis was visualized  The inferior trocars were removed under direct visualization  The laparoscope was withdrawn from the abdomen, followed by its trocar sleeve at the umbilicus  Skin incisions were closed with 4-0 Monocryl covered with Exofin  Pagan catheter was removed  At the conclusion of the procedure, all needle, sponge, and instrument counts were noted to be correct x2  Patient tolerated the procedure well and was transferred to PACU in stable condition      SIGNATURE: Lorrie Sharp MD  DATE: November 15, 2022  TIME: 12:58 PM

## 2022-11-15 NOTE — PROGRESS NOTES
Juan Villaseñor Head 23 y o  admitted for observation of RLQ pain c/f intermittent ovarian torsion     Reports pain now worse, not as sharp or severe as initial presentation, no nausea, however endorses 7/10 in severity, achy and pressure-like in RLQ  Some radiation into labia, back and down leg    Responded to oxycodone for a couple of hours  Able to ambulate to bathroom with minor discomfort, but no severe pain    No current nausea, vomiting, chest pain or shortness of breath  Mild bleeding in urine, but thinks it's light vaginal bleeding mixed with urine  Frustrated because doesn't understand what's wrong with her    Vitals:    11/15/22 0226   BP: 139/87   Pulse: 83   Resp: 18   Temp:    SpO2: 95%   Gen: tearful, frustrated, alert, no acute distress  Pulm: normal respiratory effort   CV: regular rate and rhythm  Abd: soft, non-distended, mod ttp in RLQ, no rebound or guarding, able to sit up without difficulty  Back: no CVAT  Ext: full range of motion    · Reviewed uncertain etiology of discomfort  · Initial presentation suggestive of ovarian torsion, however, current exam not completely consistent with torsion picture  · Discussed differential includes inflammation from earlier torsion, ruptured ovarian cyst, renal stone not previously visualized on CT, or repeat, current torsion  · If unable to control pain, or if continues to require pain medication, should have diagnostic laparoscopy  · She and her family at bedside are very nervous about pursuing surgery and do not want to agree to surgery without repeat imaging  · Explained limitations of imaging--explained torsion is clinical diagnosis and not made from ultrasound images and explained there will also be delay with ultrasound, likely not able to occur until at 7AM  · If more uncomfortable, I would recommend surgery now and not repeat imaging  · She indicates understanding, but does not want to move forward with surgery without imaging, if clinical picture were to dramatically worsen prior to completion of repeat imaging, she would accept surgery prior to [address]   11/15/22   3:25 AM

## 2022-11-15 NOTE — ANESTHESIA POSTPROCEDURE EVALUATION
Post-Op Assessment Note    CV Status:  Stable    Pain management: adequate     Mental Status:  Lethargic and sleepy   Hydration Status:  Stable   PONV Controlled:  None   Airway Patency:  Patent      Post Op Vitals Reviewed: Yes      Staff: CRNA         No complications documented      BP   124/68   Temp   97 1   Pulse  74   Resp   17   SpO2   97

## 2022-11-15 NOTE — UTILIZATION REVIEW
Initial Clinical Review    Admission: Date/Time/Statement:   Admission Orders (From admission, onward)     Ordered        11/14/22 4716  Place in Observation  Once                      Orders Placed This Encounter   Procedures   • Place in Observation     Standing Status:   Standing     Number of Occurrences:   1     Order Specific Question:   Level of Care     Answer:   Med Surg [16]     ED Arrival Information     Expected   11/14/2022     Arrival   11/14/2022 22:32    Acuity   Urgent            Means of arrival   Ambulance    Escorted by   [facility] [place] [facility])    Service   OB/GYN    Admission type   Emergency            Arrival complaint   R/O ovarian torsion           Chief Complaint   Patient presents with   • Pelvic Pain     Patient here for evaluation of possible torsion  C/o pain in RLQ  Initial Presentation: 23 y o  female with hx asthma  who presents as transfer from [facility] to [facility] for higher level of care /OB/GYN services  Pt presented to [facility] 11/14 with R lower flank pain radiating to RLQ abdomen, associated N/V that started this am    Pt also reports vaginal bleeding  Imaging showed concern for torsion, enlarged ovary   WBC 13 9   Pt given analgesics, transferred to [facility]  On exam at [facility] [place], pt has RLQ soreness, no peritoneal signs  Pt given po analgesic in ED at [facility]   Pt admitted as OBS by OB/GYN service with enlarged ovary, RLQ pain with concern for intermittent ovarian torsion  Plan -NPO, IVF, pain control, abdominal exams  Date: 11/15   Pt with worsening pain overnight 7/10, achey, pressure in RLQ  Abdomen soft, non distended   DDX-inflammation from earlier torsion, ruptured ovarian cyst, renal stone not previously visualized on CT, or repeat torsion  If pain uncontrolled, should have diagnostic lap  Pt and family do not want to pursue surgery before repeat imaging   US 11/15 continues to suggest  intermittent or partial right ovarian torsion    Plan for OR today       11/15/22 @9053  LAPAROSCOPY DIAGNOSTIC, detorsion of right ovary (Right)   Anesthesia General /ETT  Operative Findings:  1   External genitalia grossly normal in appearance   No ulcerations, no lacerations, no lesions   Bimanual exam revealed small anteverted uterus with normal contours and freely mobile   No adnexal masses palpated bilaterally  2   Laparoscopic evaluation revealed no injury to bowel or bladder vasculature upon entrance   Uterus was small and anteverted  Adhesions were not present  3  Right ovarian pedicle was noted to be torsed with right fallopian tube  Mildly dusky on initial appearance but pink color returned after detorsion  Right fallopian tube mildly edematous  Right ovary was enlarged but without discrete single cyst    4  Left ovary enlarged but without evidence of discrete single cyst  Left fallopian tube normal    5  Distended bowels consistent with constipation  6   Upper abdominal organs were visualized and grossly normal in appearance   These include liver, stomach, and omentum       POD #0- pt denies pain post op, ambulating and desires d/c today         ED Triage Vitals   Temperature Pulse Respirations Blood Pressure SpO2   11/14/22 2244 11/14/22 2244 11/14/22 2244 11/14/22 2244 11/14/22 2244   98 3 °F (36 8 °C) 75 18 133/80 95 %      Temp Source Heart Rate Source Patient Position - Orthostatic VS BP Location FiO2 (%)   11/14/22 2244 11/14/22 2244 11/14/22 2244 11/14/22 2244 --   Oral Monitor Lying Right arm       Pain Score       11/15/22 0228       7          Wt Readings from Last 1 Encounters:   11/14/22 96 7 kg (213 lb 3 oz)     Additional Vital Signs:   Date/Time Temp Pulse Resp BP MAP (mmHg) SpO2 O2 Device Patient Position - Orthostatic VS   11/15/22 0226 -- 83 18 139/87 104 95 % None (Room air) --   11/15/22 0100 -- 79 -- 127/76 97 96 % -- --   11/14/22 2300 -- 83 18 132/75 99 96 % --        Pertinent Labs/Diagnostic Test Results:   US pelvis complete w transvaginal Final Result by Bettye Stewart MD (44/21 [id number])       Persistent findings suggesting intermittent or partial right ovarian torsion compared to 11/15/2022  Workstation performed: [id number]         US kidney and bladder   Final Result by Camila Hand MD (47/38 [id number])      Normal              Workstation performed: [id number]             11/14 Lomas acampus  CT A/P-No acute inflammatory process  US pelvis-w/ transvag  1  Though normal arterial and venous waveforms are currently demonstrable in the right ovary, the right side is more than 3 times the volume of the left and there is surrounding fluid  Technologist also notes that the patient is focally tender to scanning of that side  Constellation of findings raises concern for partial or intermittent torsion  Recommend gynecology consultation    2   Bilateral polycystic ovarian morphology (PCOM) though imaging appearance, alone, is neither necessary nor sufficient to diagnose polycystic ovarian syndrome (PCOS)    Correlate for other clinical features meeting diagnostic criteria for PCOS      Results from last 7 days   Lab Units 11/15/22  0919 11/14/22  1444   WBC Thousand/uL 8 99 13 90*   HEMOGLOBIN g/dL 13 7 14 5   HEMATOCRIT % 41 8 44 5   PLATELETS Thousands/uL 336 358   NEUTROS ABS Thousands/µL 5 77 11 18*         Results from last 7 days   Lab Units 11/14/22  1444   SODIUM mmol/L 139   POTASSIUM mmol/L 4 2   CHLORIDE mmol/L 104   CO2 mmol/L 23   ANION GAP mmol/L 12   BUN mg/dL 12   CREATININE mg/dL 0 63   EGFR ml/min/1 73sq m 125   CALCIUM mg/dL 9 8     Results from last 7 days   Lab Units 11/14/22  1444   AST U/L 9   ALT U/L 29   ALK PHOS U/L 67   TOTAL PROTEIN g/dL 8 6*   ALBUMIN g/dL 4 6   TOTAL BILIRUBIN mg/dL 0 40         Results from last 7 days   Lab Units 11/14/22  1444   GLUCOSE RANDOM mg/dL 99               Results from last 7 days   Lab Units 11/14/22  1444   LIPASE u/L 78                 Results from last 7 days   Lab Units 11/14/22  1602   CLARITY UA  Clear   COLOR UA  Light Yellow   SPEC GRAV UA  1 021   PH UA  6 0   GLUCOSE UA mg/dl Negative   KETONES UA mg/dl Negative   BLOOD UA  Large*   PROTEIN UA mg/dl Negative   NITRITE UA  Negative   BILIRUBIN UA  Negative   UROBILINOGEN UA (BE) mg/dl <2 0   LEUKOCYTES UA  Negative   WBC UA /hpf 2-4*   RBC UA /hpf 4-10*   BACTERIA UA /hpf None Seen   EPITHELIAL CELLS WET PREP /hpf Occasional   MUCUS THREADS  Occasional*                 ED Treatment:   Medication Administration from 11/14/2022 2124 to 11/15/2022 0221       Date/Time Order Dose Route Action     11/15/2022 0023 oxyCODONE (ROXICODONE) immediate release tablet 10 mg 10 mg Oral Given        Past Medical History:   Diagnosis Date   • Asthma    • Dysthymia 5/15/2019     Present on Admission:  • Pelvic pain  • Ovarian torsion      Admitting Diagnosis: abdominal pain  Age/Sex: 23 y o  female  Admission Orders:  Scheduled Medications:  acetaminophen, 975 mg, Oral, Q6H  docusate sodium, 100 mg, Oral, BID     HYDROmorphone (DILAUDID) injection 0 5 mg  Dose: 0 5 mg  Freq: Once Route: IV  Start: 11/15/22 0330 End: 11/15/22 0347  cyclobenzaprine (FLEXERIL) tablet 10 mg  Dose: 10 mg  Freq: Once Route: PO  Start: 11/15/22 0330 End: 11/15/22 0347          Continuous IV Infusions:  dextrose 5 % and sodium chloride 0 45 %, 125 mL/hr, Intravenous, Continuous End: 11/15/22 1016      PRN Meds:  ibuprofen, 600 mg, Oral, Q6H PRN  ondansetron, 4 mg, Intravenous, Q6H PRN x1 11/15  oxyCODONE, 10 mg, Oral, Q4H PRN  oxyCODONE, 5 mg, Oral, Q4H PRN x1 11/15      NPO   SCD   OOB as christine    Network Utilization Review Department  ATTENTION: Please call with any questions or concerns to [phone] and carefully listen to the prompts so that you are directed to the right person   All voicemails are confidential   Lynette Kussmaul all requests for admission clinical reviews, approved or denied determinations and any other requests to dedicated fax number below belonging to the campus where the patient is receiving treatment   List of dedicated fax numbers for the Facilities:  [address] [place] [address] DENIALS (Administrative/Medical Necessity) [phone]   [address]  (Maternity/NICU/Pediatrics) [phone]   [address] She[address] Cifuentes [phone]   [address]eb[address]ieves [address] [phone]   [address] [place] [address] [place] [address] [phone]   [facility]5 [facility] [address] [facility] [phone]

## 2022-11-15 NOTE — ASSESSMENT & PLAN NOTE
· Patient reports that pain has significantly improved from yesterday  Required narcotic administration overnight but subsequently has only required Tylenol  Lab Results   Component Value Date    HGB 13 7 11/15/2022    HGB 14 5 11/14/2022    WBC 8 99 11/15/2022    WBC 13 90 (H) 11/14/2022     · WBC improved overnight  · CMP within normal limits, UA with hematuria, pregnancy test negative  · CT scan without acute inflammatory process, pelvic ultrasound with evidence of "Right ovary:  4 7 x 3 8 x 4 0 cm  37 8 mL No suspicious right ovarian abnormality  Numerous peripheralized microfollicles  Doppler flow within normal limits  Normal arterial and venous spectral waveforms  Left ovary:  3 6 x 2 4 x 2 7 cm  12 3 mL No suspicious left ovarian abnormality  Numerous peripheralized microfollicles  Doppler flow within normal limits  Normal arterial and venous spectral waveforms, right side is more than 3 times the volume of the left and there is surrounding fluid "  · Repeat US this morning showed:   · UTERUS:  · Retroflexed  6 3 x 3 2  x 3 9 cm (length by depth by width)  · Normal echotexture and morphology  · Cervix is within normal limits  · ENDOMETRIUM:    · AP caliber of 3 0 mm  · Normal thickness and echotexture     · OVARIES/ADNEXA:  · Right ovary:  4 6 x 2 7 x 3 7 cm  24 3 mL  Mildly enlarged  Approximately 3 times larger than the left ovarian volume, which is upper limits of normal   · Small and prominently peripheral peripheral   Normal, symmetric echotexture compared to the left  · Arterial and venous flow obtained  Mildly diminished venous flow compared to the left  There is a whirled appearance of the vascular pedicle  · Left ovary:  3 1 x 2 3 x 2 4 cm  8 9 mL  · No suspicious left ovarian abnormality  Also numerous small follicles, predominantly peripheral   · Doppler flow within normal limits  · No suspicious adnexal mass or loculated collections    · Small free fluid which is typically physiologic  · We reviewed all imaging in detail including possibility of torsion and inability to completely rule out torsion based on ultrasound alone  Given her improvement in symptoms and benign abdominal exam, will honor patient's wishes to avoid surgery at this time  We reviewed that we cannot be 217% certain that her ovary is not torsed or at least partially or intermittently torsing  We reviewed that it is also possible that her pain and improvement is secondary to ruptured ovarian cyst    · We reviewed that with a ruptured ovarian cyst, there is no immediately need for surgical intervention  We discussed that her pain should improve with time and continue pain management  She is aware that we should discuss prevention of cysts in the office  She wishes to have her Nexplanon removed  There is concern for new diagnosis of PCOS  · For expectant management for possible ovarian torsion, there is a risk of continued decreased blood flow possibly leading to death of the ovary  There is possibility that the condition may worsen and pain would return  She is aware to immediately call and office and return to the ED for further evaluation if pain worsens or she has significant nausea and vomiting without relief  · Patient has requested discharge home at this time  Will send Toradol and Zofran to the pharmacy of her choice  Counseled on short term use and avoiding additional NSAIDs with Toradol  · Patient is hungry and would like to eat at this time   Will order regular diet given that decision has been made against surgical management  · Pain: Tylenol, chanda 5 ordered

## 2022-11-15 NOTE — EMTALA/ACUTE CARE TRANSFER
[address] [place] [address] 20  [address] [address] [facility] [address]  Dept: [phone]      EMTALA TRANSFER CONSENT    NAME Valentino Roach                                         2000                              MRN [id number]    I have been informed of my rights regarding examination, treatment, and transfer   by Dr Trevor Fernandes, *    Benefits: Specialized equipment and/or services available at the receiving facility (Include comment)________________________ (OBGYN)    Risks: Potential for delay in receiving treatment, Potential deterioration of medical condition, Loss of IV, Increased discomfort during transfer, Possible worsening of condition or death during transfer      Consent for Transfer:  I acknowledge that my medical condition has been evaluated and explained to me by the emergency department physician or other qualified medical person and/or my attending physician, who has recommended that I be transferred to the service of  Accepting Physician: Kameron Sarkar at [address] Name, [address] : SLR  The above potential benefits of such transfer, the potential risks associated with such transfer, and the probable risks of not being transferred have been explained to me, and I fully understand them  The doctor has explained that, in my case, the benefits of transfer outweigh the risks  I agree to be transferred  I authorize the performance of emergency medical procedures and treatments upon me in both transit and upon arrival at the receiving facility  Additionally, I authorize the release of any and all medical records to the receiving facility and request they be transported with me, if possible  I understand that the safest mode of transportation during a medical emergency is an ambulance and that the Hospital advocates the use of this mode of transport   Risks of traveling to the receiving facility by car, including absence of medical control, life sustaining equipment, such as oxygen, and medical personnel has been explained to me and I fully understand them  (SHAZIA CORRECT BOX BELOW)  [  ]  I consent to the stated transfer and to be transported by ambulance/helicopter  [  ]  I consent to the stated transfer, but refuse transportation by ambulance and accept full responsibility for my transportation by car  I understand the risks of non-ambulance transfers and I exonerate the Hospital and its staff from any deterioration in my condition that results from this refusal     X___________________________________________    DATE  22  TIME________  Signature of patient or legally responsible individual signing on patient behalf           RELATIONSHIP TO PATIENT_________________________          Provider Certification    NAME Raymond Csaey Head                                         2000                              MRN [id number]    A medical screening exam was performed on the above named patient  Based on the examination:    Condition Necessitating Transfer There were no encounter diagnoses      Patient Condition: The patient has been stabilized such that within reasonable medical probability, no material deterioration of the patient condition or the condition of the unborn child(aashish) is likely to result from the transfer    Reason for Transfer: Level of Care needed not available at this facility    Transfer Requirements: [place]   · Space available and qualified personnel available for treatment as acknowledged by Kori Wang  · Agreed to accept transfer and to provide appropriate medical treatment as acknowledged by       Whit Yu  · Appropriate medical records of the examination and treatment of the patient are provided at the time of transfer   [address] _______  · Transfer will be performed by qualified personnel from [facility]  and appropriate transfer equipment as required, including the use of necessary and appropriate life support measures  Provider Certification: I have examined the patient and explained the following risks and benefits of being transferred/refusing transfer to the patient/family:  General risk, such as traffic hazards, adverse weather conditions, rough terrain or turbulence, possible failure of equipment (including vehicle or aircraft), or consequences of actions of persons outside the control of the transport personnel, Unanticipated needs of medical equipment and personnel during transport, Risk of worsening condition, The possibility of a transport vehicle being unavailable      Based on these reasonable risks and benefits to the patient and/or the unborn child(aashish), and based upon the information available at the time of the patient’s examination, I certify that the medical benefits reasonably to be expected from the provision of appropriate medical treatments at another medical facility outweigh the increasing risks, if any, to the individual’s medical condition, and in the case of labor to the unborn child, from effecting the transfer      X____________________________________________ DATE 11/14/22        TIME_______      ORIGINAL - SEND TO MEDICAL RECORDS   COPY - SEND WITH PATIENT DURING TRANSFER

## 2022-11-16 ENCOUNTER — TELEPHONE (OUTPATIENT)
Dept: OBGYN CLINIC | Facility: CLINIC | Age: 22
End: 2022-11-16

## 2022-11-16 NOTE — TELEPHONE ENCOUNTER
Pt's mother calling in regards to pt, had surgery yesterday and was inquiring about showering instructions and wound care, glue present  Reviewed with pt's mother no soaps, lotions, powders directly on sites and to let water rinse off and pat dry, gentle  Reviewed not to pick off or remove glue as healing will come off on own  Pt's mother verbalizes understanding and aware if any further questions or concerns to call back

## 2022-11-22 ENCOUNTER — TELEPHONE (OUTPATIENT)
Dept: FAMILY MEDICINE CLINIC | Facility: CLINIC | Age: 22
End: 2022-11-22

## 2022-11-22 DIAGNOSIS — F34.1 DYSTHYMIA: ICD-10-CM

## 2022-11-23 RX ORDER — FLUOXETINE 10 MG/1
10 TABLET, FILM COATED ORAL DAILY
Qty: 90 TABLET | Refills: 1 | Status: SHIPPED | OUTPATIENT
Start: 2022-11-23

## 2023-01-28 ENCOUNTER — APPOINTMENT (EMERGENCY)
Dept: CT IMAGING | Facility: HOSPITAL | Age: 23
End: 2023-01-28

## 2023-01-28 ENCOUNTER — HOSPITAL ENCOUNTER (OUTPATIENT)
Facility: HOSPITAL | Age: 23
Setting detail: OBSERVATION
Discharge: HOME | End: 2023-01-29
Attending: EMERGENCY MEDICINE | Admitting: HOSPITALIST

## 2023-01-28 ENCOUNTER — APPOINTMENT (EMERGENCY)
Dept: RADIOLOGY | Facility: HOSPITAL | Age: 23
End: 2023-01-28

## 2023-01-28 DIAGNOSIS — N83.8 ENLARGED OVARY: Primary | ICD-10-CM

## 2023-01-28 DIAGNOSIS — F41.9 ANXIETY: ICD-10-CM

## 2023-01-28 DIAGNOSIS — D72.829 LEUKOCYTOSIS: ICD-10-CM

## 2023-01-28 DIAGNOSIS — I49.9 DYSRHYTHMIA: ICD-10-CM

## 2023-01-28 LAB
ALBUMIN SERPL BCP-MCNC: 4.7 G/DL (ref 3.5–5)
ALP SERPL-CCNC: 66 U/L (ref 34–104)
ALT SERPL W P-5'-P-CCNC: 48 U/L (ref 7–52)
ANION GAP SERPL CALCULATED.3IONS-SCNC: 12 MMOL/L (ref 4–13)
APTT PPP: 24 SECONDS (ref 23–37)
AST SERPL W P-5'-P-CCNC: 20 U/L (ref 13–39)
BACTERIA UR QL AUTO: ABNORMAL /HPF
BASOPHILS # BLD AUTO: 0.07 THOUSANDS/ÂΜL (ref 0–0.1)
BASOPHILS NFR BLD AUTO: 0 % (ref 0–1)
BILIRUB SERPL-MCNC: 0.45 MG/DL (ref 0.2–1)
BILIRUB UR QL STRIP: NEGATIVE
BNP SERPL-MCNC: 48 PG/ML (ref 0–100)
BUN SERPL-MCNC: 8 MG/DL (ref 5–25)
CALCIUM SERPL-MCNC: 9.4 MG/DL (ref 8.4–10.2)
CARDIAC TROPONIN I PNL SERPL HS: 13 NG/L
CHLORIDE SERPL-SCNC: 102 MMOL/L (ref 96–108)
CLARITY UR: CLEAR
CO2 SERPL-SCNC: 21 MMOL/L (ref 21–32)
COLOR UR: ABNORMAL
CREAT SERPL-MCNC: 1.11 MG/DL (ref 0.6–1.3)
D DIMER PPP FEU-MCNC: 0.29 UG/ML FEU
EOSINOPHIL # BLD AUTO: 0.02 THOUSAND/ÂΜL (ref 0–0.61)
EOSINOPHIL NFR BLD AUTO: 0 % (ref 0–6)
ERYTHROCYTE [DISTWIDTH] IN BLOOD BY AUTOMATED COUNT: 12.6 % (ref 11.6–15.1)
EXT PREGNANCY TEST URINE: NEGATIVE
EXT. CONTROL: NORMAL
GFR SERPL CREATININE-BSD FRML MDRD: 69 ML/MIN/1.73SQ M
GLUCOSE SERPL-MCNC: 144 MG/DL (ref 65–140)
GLUCOSE UR STRIP-MCNC: NEGATIVE MG/DL
HCT VFR BLD AUTO: 43.2 % (ref 34.8–46.1)
HGB BLD-MCNC: 14.1 G/DL (ref 11.5–15.4)
HGB UR QL STRIP.AUTO: NEGATIVE
HYALINE CASTS #/AREA URNS LPF: ABNORMAL /LPF
IMM GRANULOCYTES # BLD AUTO: 0.08 THOUSAND/UL (ref 0–0.2)
IMM GRANULOCYTES NFR BLD AUTO: 0 % (ref 0–2)
INR PPP: 0.99 (ref 0.84–1.19)
KETONES UR STRIP-MCNC: NEGATIVE MG/DL
LACTATE SERPL-SCNC: 1.9 MMOL/L (ref 0.5–2)
LEUKOCYTE ESTERASE UR QL STRIP: NEGATIVE
LYMPHOCYTES # BLD AUTO: 3.18 THOUSANDS/ÂΜL (ref 0.6–4.47)
LYMPHOCYTES NFR BLD AUTO: 16 % (ref 14–44)
MAGNESIUM SERPL-MCNC: 1.9 MG/DL (ref 1.9–2.7)
MCH RBC QN AUTO: 29.1 PG (ref 26.8–34.3)
MCHC RBC AUTO-ENTMCNC: 32.6 G/DL (ref 31.4–37.4)
MCV RBC AUTO: 89 FL (ref 82–98)
MONOCYTES # BLD AUTO: 1.17 THOUSAND/ÂΜL (ref 0.17–1.22)
MONOCYTES NFR BLD AUTO: 6 % (ref 4–12)
NEUTROPHILS # BLD AUTO: 15.26 THOUSANDS/ÂΜL (ref 1.85–7.62)
NEUTS SEG NFR BLD AUTO: 78 % (ref 43–75)
NITRITE UR QL STRIP: NEGATIVE
NON-SQ EPI CELLS URNS QL MICRO: ABNORMAL /HPF
NRBC BLD AUTO-RTO: 0 /100 WBCS
PH UR STRIP.AUTO: 7 [PH]
PLATELET # BLD AUTO: 433 THOUSANDS/UL (ref 149–390)
PMV BLD AUTO: 9.9 FL (ref 8.9–12.7)
POTASSIUM SERPL-SCNC: 3.6 MMOL/L (ref 3.5–5.3)
PROT SERPL-MCNC: 7.9 G/DL (ref 6.4–8.4)
PROT UR STRIP-MCNC: ABNORMAL MG/DL
PROTHROMBIN TIME: 13.3 SECONDS (ref 11.6–14.5)
RBC # BLD AUTO: 4.85 MILLION/UL (ref 3.81–5.12)
RBC #/AREA URNS AUTO: ABNORMAL /HPF
SODIUM SERPL-SCNC: 135 MMOL/L (ref 135–147)
SP GR UR STRIP.AUTO: 1.01 (ref 1–1.03)
TSH SERPL DL<=0.05 MIU/L-ACNC: 2.58 UIU/ML (ref 0.45–4.5)
UROBILINOGEN UR STRIP-ACNC: <2 MG/DL
WBC # BLD AUTO: 19.78 THOUSAND/UL (ref 4.31–10.16)
WBC #/AREA URNS AUTO: ABNORMAL /HPF

## 2023-01-28 RX ADMIN — IOHEXOL 100 ML: 350 INJECTION, SOLUTION INTRAVENOUS at 23:42

## 2023-01-28 RX ADMIN — SODIUM CHLORIDE 1000 ML: 0.9 INJECTION, SOLUTION INTRAVENOUS at 21:54

## 2023-01-29 ENCOUNTER — APPOINTMENT (OUTPATIENT)
Dept: MRI IMAGING | Facility: HOSPITAL | Age: 23
End: 2023-01-29

## 2023-01-29 ENCOUNTER — APPOINTMENT (EMERGENCY)
Dept: ULTRASOUND IMAGING | Facility: HOSPITAL | Age: 23
End: 2023-01-29

## 2023-01-29 VITALS
RESPIRATION RATE: 18 BRPM | WEIGHT: 215 LBS | DIASTOLIC BLOOD PRESSURE: 69 MMHG | SYSTOLIC BLOOD PRESSURE: 102 MMHG | HEART RATE: 105 BPM | TEMPERATURE: 98.6 F | OXYGEN SATURATION: 98 % | BODY MASS INDEX: 34.55 KG/M2 | HEIGHT: 66 IN

## 2023-01-29 PROBLEM — K21.9 GERD (GASTROESOPHAGEAL REFLUX DISEASE): Status: ACTIVE | Noted: 2023-01-29

## 2023-01-29 PROBLEM — E66.9 OBESITY (BMI 30.0-34.9): Status: ACTIVE | Noted: 2023-01-29

## 2023-01-29 PROBLEM — R65.10 SIRS (SYSTEMIC INFLAMMATORY RESPONSE SYNDROME) (HCC): Status: ACTIVE | Noted: 2023-01-29

## 2023-01-29 PROBLEM — N83.201 RIGHT OVARIAN CYST: Status: ACTIVE | Noted: 2023-01-29

## 2023-01-29 PROBLEM — R00.2 PALPITATIONS: Status: ACTIVE | Noted: 2023-01-29

## 2023-01-29 PROBLEM — J45.20 MILD INTERMITTENT ASTHMA: Status: ACTIVE | Noted: 2019-07-16

## 2023-01-29 LAB
ANION GAP SERPL CALCULATED.3IONS-SCNC: 7 MMOL/L (ref 4–13)
ATRIAL RATE: 139 BPM
BASOPHILS # BLD AUTO: 0.05 THOUSANDS/ÂΜL (ref 0–0.1)
BASOPHILS NFR BLD AUTO: 0 % (ref 0–1)
BUN SERPL-MCNC: 7 MG/DL (ref 5–25)
CALCIUM SERPL-MCNC: 8.2 MG/DL (ref 8.4–10.2)
CHLORIDE SERPL-SCNC: 107 MMOL/L (ref 96–108)
CHOLEST SERPL-MCNC: 118 MG/DL
CO2 SERPL-SCNC: 22 MMOL/L (ref 21–32)
CREAT SERPL-MCNC: 0.56 MG/DL (ref 0.6–1.3)
EOSINOPHIL # BLD AUTO: 0.03 THOUSAND/ÂΜL (ref 0–0.61)
EOSINOPHIL NFR BLD AUTO: 0 % (ref 0–6)
ERYTHROCYTE [DISTWIDTH] IN BLOOD BY AUTOMATED COUNT: 12.7 % (ref 11.6–15.1)
EST. AVERAGE GLUCOSE BLD GHB EST-MCNC: 103 MG/DL
GFR SERPL CREATININE-BSD FRML MDRD: 130 ML/MIN/1.73SQ M
GLUCOSE SERPL-MCNC: 102 MG/DL (ref 65–140)
HBA1C MFR BLD: 5.2 %
HCT VFR BLD AUTO: 37.2 % (ref 34.8–46.1)
HDLC SERPL-MCNC: 37 MG/DL
HGB BLD-MCNC: 12.1 G/DL (ref 11.5–15.4)
IMM GRANULOCYTES # BLD AUTO: 0.08 THOUSAND/UL (ref 0–0.2)
IMM GRANULOCYTES NFR BLD AUTO: 1 % (ref 0–2)
LDLC SERPL CALC-MCNC: 71 MG/DL (ref 0–100)
LYMPHOCYTES # BLD AUTO: 2.32 THOUSANDS/ÂΜL (ref 0.6–4.47)
LYMPHOCYTES NFR BLD AUTO: 17 % (ref 14–44)
MCH RBC QN AUTO: 29.2 PG (ref 26.8–34.3)
MCHC RBC AUTO-ENTMCNC: 32.5 G/DL (ref 31.4–37.4)
MCV RBC AUTO: 90 FL (ref 82–98)
MONOCYTES # BLD AUTO: 1.03 THOUSAND/ÂΜL (ref 0.17–1.22)
MONOCYTES NFR BLD AUTO: 8 % (ref 4–12)
NEUTROPHILS # BLD AUTO: 9.84 THOUSANDS/ÂΜL (ref 1.85–7.62)
NEUTS SEG NFR BLD AUTO: 74 % (ref 43–75)
NRBC BLD AUTO-RTO: 0 /100 WBCS
P AXIS: 64 DEGREES
PLATELET # BLD AUTO: 330 THOUSANDS/UL (ref 149–390)
PMV BLD AUTO: 10 FL (ref 8.9–12.7)
POTASSIUM SERPL-SCNC: 3.5 MMOL/L (ref 3.5–5.3)
PR INTERVAL: 140 MS
PROCALCITONIN SERPL-MCNC: <0.05 NG/ML
PROCALCITONIN SERPL-MCNC: <0.05 NG/ML
QRS AXIS: 54 DEGREES
QRSD INTERVAL: 78 MS
QT INTERVAL: 280 MS
QTC INTERVAL: 426 MS
RBC # BLD AUTO: 4.14 MILLION/UL (ref 3.81–5.12)
SODIUM SERPL-SCNC: 136 MMOL/L (ref 135–147)
T WAVE AXIS: 37 DEGREES
TRIGL SERPL-MCNC: 51 MG/DL
VENTRICULAR RATE: 139 BPM
WBC # BLD AUTO: 13.35 THOUSAND/UL (ref 4.31–10.16)

## 2023-01-29 RX ORDER — HYDROXYZINE HYDROCHLORIDE 25 MG/1
25 TABLET, FILM COATED ORAL ONCE
Status: COMPLETED | OUTPATIENT
Start: 2023-01-29 | End: 2023-01-29

## 2023-01-29 RX ORDER — FLUOXETINE 10 MG/1
10 CAPSULE ORAL DAILY
Status: DISCONTINUED | OUTPATIENT
Start: 2023-01-29 | End: 2023-01-29 | Stop reason: HOSPADM

## 2023-01-29 RX ORDER — ALPRAZOLAM 0.25 MG/1
0.25 TABLET ORAL DAILY PRN
Qty: 15 TABLET | Refills: 0 | Status: SHIPPED | OUTPATIENT
Start: 2023-01-29

## 2023-01-29 RX ORDER — HYDROXYZINE HYDROCHLORIDE 25 MG/1
25 TABLET, FILM COATED ORAL EVERY 6 HOURS PRN
Status: DISCONTINUED | OUTPATIENT
Start: 2023-01-29 | End: 2023-01-29 | Stop reason: HOSPADM

## 2023-01-29 RX ORDER — ACETAMINOPHEN 325 MG/1
650 TABLET ORAL EVERY 6 HOURS PRN
Status: DISCONTINUED | OUTPATIENT
Start: 2023-01-29 | End: 2023-01-29 | Stop reason: HOSPADM

## 2023-01-29 RX ORDER — ALBUTEROL SULFATE 90 UG/1
2 AEROSOL, METERED RESPIRATORY (INHALATION) EVERY 6 HOURS PRN
Status: DISCONTINUED | OUTPATIENT
Start: 2023-01-29 | End: 2023-01-29 | Stop reason: HOSPADM

## 2023-01-29 RX ADMIN — SODIUM CHLORIDE 1000 ML: 0.9 INJECTION, SOLUTION INTRAVENOUS at 00:44

## 2023-01-29 RX ADMIN — HYDROXYZINE HYDROCHLORIDE 25 MG: 25 TABLET ORAL at 03:56

## 2023-01-29 RX ADMIN — FLUOXETINE 10 MG: 10 CAPSULE ORAL at 08:47

## 2023-01-29 NOTE — DISCHARGE SUMMARY
Discharge Summary - [facility]    Patient Information: Jaxon Flores 20 y o  female MRN: [id number]  Unit/Bed#: S -01 Encounter: [id number]    Discharging Physician / Practitioner: Christopher Moreno MD  PCP: Dianna Knight [place]  Admission Date: 1/28/2023  Discharge Date: 01/29/23    Reason for Admission: Palpitations    Discharge Diagnoses:     Principal Problem:    Palpitations  Active Problems:    Mild intermittent asthma    Right ovarian cyst    Obesity (BMI 30 0-34  9)    SIRS (systemic inflammatory response syndrome) ([facility] [address] )      Consultations During Hospital Stay:  · None    Procedures Performed:   · None    Significant findings:  · None    Hospital Course:   Jaxon Flores is a 20 y o  female patient who originally presented to the hospital on 1/28/2023 due to palpitations and facial numbness  Patient presented with complaints of sudden onset palpitations and left facial numbness  She was noted to be tachycardic in the ED  Work-up which included EKG, chest x-ray, CT of the abdomen/pelvis, pelvic ultrasound and MRI of the brain were all unremarkable for any acute findings  She had an initial leukocytosis at 19 78 on admission which trended down without intervention to 13 3 prior to discharge  Procalcitonin was negative x2, TSH was within normal limits  Etiology for symptoms was unclear however there was suggestion of possible anxiety/panic episode  Telemetry overnight was unremarkable  No additional in-hospital needs were identified and patient was cleared for discharge home with outpatient follow-up  Condition at Discharge: stable     Discharge Day Visit / Exam:     Subjective: No new complaints or acute overnight events  Patient's heart rate was in the 80s on telemetry prior to entering the room  She went up to the low 100s during my evaluation  She denied any palpitations at the time of my evaluation  No chest pain and facial numbness also resolved      Vitals: Blood Pressure: 102/69 (01/29/23 0718)  Pulse: 105 (01/29/23 0718)  Temperature: 98 6 °F (37 °C) (01/29/23 0718)  Temp Source: Oral (01/29/23 0718)  Respirations: 18 (01/29/23 0718)  Height: 5' 6" (167 6 cm) (01/28/23 2119)  Weight - Scale: 97 5 kg (215 lb) (01/28/23 2119)  SpO2: 98 % (01/29/23 0718)    General Appearance:    Alert, cooperative, no distress, appropriately responsive    Head:    Normocephalic, mucous membranes moist   Eyes:    Conjunctiva/corneas clear   Neck:   Supple   Lungs:     Clear to auscultation bilaterally, respirations unlabored, no crackles or wheeze     Heart:    Regular rate and rhythm, S1 and S2    Abdomen:     Soft, non-tender   Extremities:   Extremities normal, atraumatic, no cyanosis or edema   Neurologic:  nonfocal      Discharge instructions/Information to patient and family:   See after visit summary for information provided to patient and family  Provisions for Follow-Up Care:  See after visit summary for information related to follow-up care and any pertinent home health orders  Disposition: Home    Patient's mother updated at the bedside, all questions answered    Discharge Statement:  I spent >30 minutes discharging the patient  This time was spent on the day of discharge  I had direct contact with the patient on the day of discharge  Greater than 50% of the total time was spent examining patient, answering all patient questions, arranging and discussing plan of care with patient as well as directly providing post-discharge instructions  Additional time then spent on discharge activities  Discharge Medications:  See after visit summary for reconciled discharge medications provided to patient and family  ** Please Note: Dragon 360 Dictation voice to text software may have been used in the creation of this document   **

## 2023-01-29 NOTE — ED NOTES
Blood cultures drawn and kept at bedside per provider Corbin Haywood request       Srini Castro RN  01/28/23 2040

## 2023-01-29 NOTE — ASSESSMENT & PLAN NOTE
· CT A/P: "The right ovary is larger than the left and there is an approximately 4 x 2 3 cm hypodense area on the right ovary, possibly a right ovarian cyst "  · Evaluated by GYN in ED  · Transvaginal US: "No evidence of ovarian torsion "  · Continue outpatient follow up

## 2023-01-29 NOTE — ED PROVIDER NOTES
History  Chief Complaint   Patient presents with   • Chest Pain     Patient reports noting SOB, dizziness, b/l numbness, chest pain/palpitations starting this evening  Patient is a 53-year-old female with a history of asthma, dysrhythmia no seen past surgical history the presents emerged part with potation's for 1 day  Patient presents with her mother this evening and provides part of patient history  Patient states that "I have been feeling funny all day, and tingly "  Patient checked her pulse at home and reported that it had elevated up to 170/min  Patient denies chest pain and shortness of breath symptoms  Patient denies recent EtOH or illicit drug use  Patient denies recent or current pregnancy  Patient denies headedness or syncopal episode  Patient denies palliative and provocative factors  Patient denies noneffective treatment  Patient denies fevers, chills, nausea, vomiting, diarrhea, constipation, urinary symptoms  Patient denies recent fall recent trauma  Patient denies sick contacts recent travel  Patient denies chest pain, shortness breath, and abdominal pain  History provided by:  Patient   used: No    Chest Pain      Prior to Admission Medications   Prescriptions Last Dose Informant Patient Reported? Taking?    ALBUTEROL SULFATE IN   Yes Yes   Sig: Inhale 1 Inhaler every 6 (six) hours as needed   FLUoxetine (PROzac) 10 MG tablet   No Yes   Sig: Take 1 tablet (10 mg total) by mouth daily   acetaminophen (TYLENOL) 325 mg tablet   No Yes   Sig: Take 3 tablets (975 mg total) by mouth every 6 (six) hours   albuterol (PROVENTIL HFA,VENTOLIN HFA) 90 mcg/act inhaler 1/28/2023 at 1630  No Yes   Sig: INHALE 2 PUFFS EVERY 6 HOURS AS NEEDED FOR WHEEZING   etonogestrel (NEXPLANON) subdermal implant   Yes Yes   Sig: Inject 68 mg under the skin continuous   montelukast (SINGULAIR) 10 mg tablet Not Taking  No No   Sig: Take 1 tablet (10 mg total) by mouth daily at bedtime Patient not taking: Reported on 1/28/2023   ondansetron (ZOFRAN) 4 mg tablet Not Taking  No No   Sig: Take 1 tablet (4 mg total) by mouth every 8 (eight) hours as needed for nausea or vomiting   Patient not taking: Reported on 1/28/2023   pantoprazole (PROTONIX) 20 mg tablet Not Taking  No No   Sig: TAKE 1 TABLET BY MOUTH DAILY BEFORE BREAKFAST  Patient not taking: Reported on 1/28/2023      Facility-Administered Medications: None       Past Medical History:   Diagnosis Date   • Asthma    • Dysthymia 5/15/2019   • GERD (gastroesophageal reflux disease) 1/29/2023       Past Surgical History:   Procedure Laterality Date   • NO PAST SURGERIES     • AK LAPS ABD PRTM&OMENTUM DX W/WO SPEC BR/WA SPX Right 11/15/2022    Procedure: LAPAROSCOPY DIAGNOSTIC, detorsion of right ovary;  Surgeon: Angie Hill MD;  Location: [facility];  Service: Gynecology       Family History   Problem Relation Age of Onset   • Breast cancer Other    • No Known Problems Mother    • No Known Problems Father      I have reviewed and agree with the history as documented  E-Cigarette/Vaping   • E-Cigarette Use Never User      E-Cigarette/Vaping Substances   • Nicotine No    • THC No    • CBD No    • Flavoring No    • Other No    • Unknown No      Social History     Tobacco Use   • Smoking status: Never   • Smokeless tobacco: Never   Vaping Use   • Vaping Use: Never used   Substance Use Topics   • Alcohol use: No   • Drug use: No       Review of Systems   Cardiovascular: Positive for chest pain         Physical Exam  Physical Exam    Vital Signs  ED Triage Vitals [01/28/23 2119]   Temperature Pulse Respirations Blood Pressure SpO2   98 9 °F (37 2 °C) (!) 122 22 138/80 100 %      Temp Source Heart Rate Source Patient Position - Orthostatic VS BP Location FiO2 (%)   Oral Monitor Sitting Right arm --      Pain Score       4           Vitals:    01/29/23 0117 01/29/23 0300 01/29/23 0428 01/29/23 0718   BP:  121/58 128/75 102/69   Pulse: 95 (!) 111 99 105   Patient Position - Orthostatic VS:   Lying          Visual Acuity      ED Medications  Medications   albuterol (PROVENTIL HFA,VENTOLIN HFA) inhaler 2 puff (has no administration in time range)   FLUoxetine (PROzac) capsule 10 mg (has no administration in time range)   acetaminophen (TYLENOL) tablet 650 mg (has no administration in time range)   hydrOXYzine HCL (ATARAX) tablet 25 mg (has no administration in time range)   sodium chloride 0 9 % bolus 1,000 mL (0 mL Intravenous Stopped 1/29/23 0034)   sodium chloride 0 9 % bolus 1,000 mL (0 mL Intravenous Stopped 1/29/23 0144)   iohexol (OMNIPAQUE) 350 MG/ML injection (SINGLE-DOSE) 100 mL (100 mL Intravenous Given 1/28/23 2342)   hydrOXYzine HCL (ATARAX) tablet 25 mg (25 mg Oral Given 1/29/23 0356)       Diagnostic Studies  Results Reviewed     Procedure Component Value Units Date/Time    Blood culture #1 [[id number]] Collected: 01/28/23 2300    Lab Status: Preliminary result Specimen: Blood from Arm, Left Updated: 01/29/23 0801     Blood Culture Received in Microbiology Lab  Culture in Progress  Blood culture #2 [[id number]] Collected: 01/28/23 2300    Lab Status: Preliminary result Specimen: Blood from Arm, Right Updated: 01/29/23 0801     Blood Culture Received in Microbiology Lab  Culture in Progress  Procalcitonin [[id number]]  (Normal) Collected: 01/28/23 2120    Lab Status: Final result Specimen: Blood from Arm, Left Updated: 01/29/23 0731     Procalcitonin <0 05 ng/ml     TSH, 3rd generation with Free T4 reflex [[id number]]  (Normal) Collected: 01/28/23 2120    Lab Status: Final result Specimen: Blood from Arm, Left Updated: 01/28/23 2352     TSH 3RD GENERATON 2 576 uIU/mL     Narrative:      Patients undergoing fluorescein dye angiography may retain small amounts of fluorescein in the body for 48-72 hours post procedure  Samples containing fluorescein can produce falsely depressed TSH values   If the patient had this procedure,a specimen should be resubmitted post fluorescein clearance        HS Troponin 0hr (reflex protocol) [[id number]]  (Normal) Collected: 01/28/23 2259    Lab Status: Final result Specimen: Blood from Arm, Left Updated: 01/28/23 2338     hs TnI 0hr 13 ng/L     Urine Microscopic [[id number]]  (Abnormal) Collected: 01/28/23 2259    Lab Status: Final result Specimen: Urine, Clean Catch Updated: 01/28/23 2328     RBC, UA 1-2 /hpf      WBC, UA 1-2 /hpf      Epithelial Cells Occasional /hpf      Bacteria, UA None Seen /hpf      Hyaline Casts, UA 5-10 /lpf     Comprehensive metabolic panel [[id number]]  (Abnormal) Collected: 01/28/23 2120    Lab Status: Final result Specimen: Blood from Arm, Left Updated: 01/28/23 2323     Sodium 135 mmol/L      Potassium 3 6 mmol/L      Chloride 102 mmol/L      CO2 21 mmol/L      ANION GAP 12 mmol/L      BUN 8 mg/dL      Creatinine 1 11 mg/dL      Glucose 144 mg/dL      Calcium 9 4 mg/dL      AST 20 U/L      ALT 48 U/L      Alkaline Phosphatase 66 U/L      Total Protein 7 9 g/dL      Albumin 4 7 g/dL      Total Bilirubin 0 45 mg/dL      eGFR 69 ml/min/1 73sq m     Narrative:      [place] guidelines for Chronic Kidney Disease (CKD):   •  Stage 1 with normal or high GFR (GFR > 90 mL/min/1 73 square meters)  •  Stage 2 Mild CKD (GFR = 60-89 mL/min/1 73 square meters)  •  Stage 3A Moderate CKD (GFR = 45-59 mL/min/1 73 square meters)  •  Stage 3B Moderate CKD (GFR = 30-44 mL/min/1 73 square meters)  •  Stage 4 Severe CKD (GFR = 15-29 mL/min/1 73 square meters)  •  Stage 5 End Stage CKD (GFR <15 mL/min/1 73 square meters)  Note: GFR calculation is accurate only with a steady state creatinine    Magnesium [[id number]]  (Normal) Collected: 01/28/23 2120    Lab Status: Final result Specimen: Blood from Arm, Left Updated: 01/28/23 2323     Magnesium 1 9 mg/dL     UA w Reflex to Microscopic w Reflex to Culture [[id number]]  (Abnormal) Collected: 01/28/23 2259    Lab Status: Final result Specimen: Urine, Clean Catch Updated: 01/28/23 2316     Color, UA Light Yellow     Clarity, UA Clear     Specific Gravity, UA 1 015     pH, UA 7 0     Leukocytes, UA Negative     Nitrite, UA Negative     Protein, UA Trace mg/dl      Glucose, UA Negative mg/dl      Ketones, UA Negative mg/dl      Urobilinogen, UA <2 0 mg/dl      Bilirubin, UA Negative     Occult Blood, UA Negative    POCT pregnancy, urine [[id number]]  (Normal) Resulted: 01/28/23 2306    Lab Status: Final result Updated: 01/28/23 2306     EXT Preg Test, Ur Negative     Control Valid    Lactic acid, plasma [[id number]]  (Normal) Collected: 01/28/23 2221    Lab Status: Final result Specimen: Blood from Arm, Left Updated: 01/28/23 2256     LACTIC ACID 1 9 mmol/L     Narrative:      Result may be elevated if tourniquet was used during collection      B-Type Natriuretic Peptide(BNP) [[id number]]  (Normal) Collected: 01/28/23 2120    Lab Status: Final result Specimen: Blood from Arm, Left Updated: 01/28/23 2216     BNP 48 pg/mL     D-dimer, quantitative [[id number]]  (Normal) Collected: 01/28/23 2120    Lab Status: Final result Specimen: Blood from Arm, Left Updated: 01/28/23 2157     D-Dimer, Quant 0 29 ug/ml FEU     Protime-INR [[id number]]  (Normal) Collected: 01/28/23 2120    Lab Status: Final result Specimen: Blood from Arm, Left Updated: 01/28/23 2152     Protime 13 3 seconds      INR 0 99    APTT [[id number]]  (Normal) Collected: 01/28/23 2120    Lab Status: Final result Specimen: Blood from Arm, Left Updated: 01/28/23 2152     PTT 24 seconds     CBC and differential [[id number]]  (Abnormal) Collected: 01/28/23 2120    Lab Status: Final result Specimen: Blood from Arm, Left Updated: 01/28/23 2136     WBC 19 78 Thousand/uL      RBC 4 85 Million/uL      Hemoglobin 14 1 g/dL      Hematocrit 43 2 %      MCV 89 fL      MCH 29 1 pg      MCHC 32 6 g/dL      RDW 12 6 %      MPV 9 9 fL      Platelets 680 Thousands/uL      nRBC 0 /100 WBCs      Neutrophils Relative 78 %      Immat GRANS % 0 %      Lymphocytes Relative 16 %      Monocytes Relative 6 %      Eosinophils Relative 0 %      Basophils Relative 0 %      Neutrophils Absolute 15 26 Thousands/µL      Immature Grans Absolute 0 08 Thousand/uL      Lymphocytes Absolute 3 18 Thousands/µL      Monocytes Absolute 1 17 Thousand/µL      Eosinophils Absolute 0 02 Thousand/µL      Basophils Absolute 0 07 Thousands/µL                  US pelvis complete w transvaginal   ED Interpretation by Erwin Saldaña PA-C (01/29 0308)   Mabel Gutierres DO  [phone] 1/29/2023     Narrative & Impression  PELVIC ULTRASOUND, COMPLETE     INDICATION:  The patient is 20years old  Rule out right ovarian torsion      COMPARISON: Multiple priors most recently CT 1/20/2023     TECHNIQUE:   Transabdominal pelvic ultrasound was performed in sagittal and transverse planes with a curvilinear transducer  Additional transvaginal imaging was performed to better evaluate the endometrium and ovaries  Imaging included volumetric   sweeps as well as traditional still imaging technique      FINDINGS:     UTERUS:  The uterus is retroverted in position, measuring 6 9 x 3 2 x 4 0 cm transvaginally  The uterus has a normal contour and echotexture  The cervix appears within normal limits      ENDOMETRIUM:    The endometrial echo complex has an AP caliber of 4 0 mm  Its appearance is within normal limits for age and cycle and shows no filling defects        OVARIES/ADNEXA:  Right ovary:  3 8 x 3 1 x 4 0 cm  25 0 mL  Left ovary:  3 7 x    1 7 x 2 3 cm  7 7 mL  Ovarian Doppler flow is within normal limits  No suspicious ovarian or adnexal abnormality    Right ovarian simple cyst measuring up to 3 2 cm     OTHER:  No free fluid or loculated fluid collections      IMPRESSION:     No evidence of ovarian torsion at time of examination      Workstation performed: [id number]        Final Result by Mabel Gutierres DO (01/29 0304)       No evidence of ovarian torsion at time of examination  Workstation performed: [id number]         CT abdomen pelvis with contrast   ED Interpretation by Rakan Duque PA-C (01/29 0207)   Dharmesh Gates MD  [phone] 1/29/2023 STAT    Narrative & Impression  CT ABDOMEN AND PELVIS WITH IV CONTRAST     INDICATION:   Abdominal pain, acute, nonlocalized  right flank pain and suprapubic pain  Leukocytosis  Prior history of right ovarian torsion      COMPARISON:  CT dated November 14, 2022, pelvic ultrasound reports dated November 14 and 15, 2022, operative report dated November 15, 2022      TECHNIQUE:  CT examination of the abdomen and pelvis was performed  Axial, sagittal, and coronal 2D reformatted images were created from the source data and submitted for interpretation      Radiation dose length product (DLP) for this visit:  467 mGy-cm   This examination, like all CT scans performed in the [facility], was performed utilizing techniques to minimize radiation dose exposure, including the use of iterative   reconstruction and automated exposure control      IV Contrast:  100 mL of iohexol (OMNIPAQUE)  Enteric Contrast:  Enteric contrast was n   ot administered      FINDINGS:     ABDOMEN     LOWER CHEST:  A 5 mm peripherally oriented pulmonary nodule within the posterior aspect of the right lower lobe of the lung (series 301 image 2) is unchanged compared with November 14, 2022      LIVER/BILIARY TREE:  Unremarkable      GALLBLADDER:  No calcified gallstones  No pericholecystic inflammatory change      SPLEEN:  Unremarkable      PANCREAS:  Unremarkable      ADRENAL GLANDS:  Unremarkable      KIDNEYS/URETERS:  Unremarkable  No hydronephrosis      STOMACH AND BOWEL:  Unremarkable      APPENDIX:  A normal appendix was visualized      ABDOMINOPELVIC CAVITY:  No ascites  No pneumoperitoneum    No lymphadenopathy      VESSELS:  Unremarkable for patient's age      PELVIS     REPRODUCTIVE ORGANS:  The right ovary is larger than the left  There is an approximately 4 x 2 3 cm hypodense area on the right ovary, possibly a right ovarian cyst      URINARY BLADDER:  Unremarkable      ABDOMINAL WALL/INGUINAL REGIONS:  Unremarkable      OSS   EOUS STRUCTURES:  No acute fracture or destructive osseous lesion      IMPRESSION:     The right ovary is larger than the left and there is an approximately 4 x 2 3 cm hypodense area on the right ovary, possibly a right ovarian cyst   Pelvic ultrasound is recommended for further evaluation      This examination demonstrates findings for which imaging follow-up is recommended and was logged as such in [address] [place] [address] Rd      The study was marked in EPIC for immediate notification            Workstation performed: [id number]           Final Result by Kerrie Kevin MD (01/29 0200)      The right ovary is larger than the left and there is an approximately 4 x 2 3 cm hypodense area on the right ovary, possibly a right ovarian cyst   Pelvic ultrasound is recommended for further evaluation  This examination demonstrates findings for which imaging follow-up is recommended and was logged as such in [address] [place] [address] Rd  The study was marked in [facility] for immediate notification              Workstation performed: [id number]         XR chest 1 view portable   ED Interpretation by Lorraine Roe PA-C (01/28 2234)   No acute cardiopulmonary disease on initial read      MRI inpatient order    (Results Pending)              Procedures  ECG 12 Lead Documentation Only    Date/Time: 1/28/2023 9:20 PM  Performed by: Lorraine Roe PA-C  Authorized by: Lorraine Roe PA-C     Indications / Diagnosis:  Palpitations  ECG reviewed by me, the ED Provider: yes    Patient location:  ED  Previous ECG:     Previous ECG:  Unavailable    Comparison to cardiac monitor: Yes    Interpretation:     Interpretation: normal    Rate:     ECG rate:  139    ECG rate assessment: tachycardic    Rhythm:     Rhythm: sinus tachycardia    Ectopy: Ectopy: none    QRS:     QRS axis:  Normal    QRS intervals:  Normal  Conduction:     Conduction: normal    ST segments:     ST segments:  Normal  T waves:     T waves: normal               ED Course  ED Course as of 01/29/23 0815   Sat Jan 28, 2023 2153 WBC(!): 19 78   2209 Patient mother requests patient having BNP ordered to which I had reported that likely infectious etiology but "would like to cover all the bases "  BNP ordered   2210 D-Dimer, Quant: 0 29   2210 Patient also reports right flank pain symptomatology as well as suprapubic pain symptomatology at reevaluation  Patient also reported that "I thought it be remanence of variant cyst "   2233 Absolute Neutrophils(!): 15 26   2234 WBC(!): 19 78   2249 Patient is going to the bathroom for urine pregnancy at this time  2330 Bacteria, UA: None Seen   2330 PREGNANCY TEST URINE: Negative   2330 Control: Valid   Sun Jan 29, 2023   0020 Patient vacillates heart rate between 105-113 while I am in the room/bedside   0145 Discussed patient case with Dr Spike Kimbrough, OBGYN, will follow case and will wait for ultrasound results   0154 Ultrasound technician will be coming in for ultrasound of transabdomen   0220 Patient ultrasound technician in patient room at this time  4025 Patient mother with request to send cx x2  [facility] Discussed patient case with mother stating that patient does not meet sepsis criteria, secondary to infection  However patient mother strongly requested cultures to be sent  Sending cultures at this time  2430 Doubt infectious etiology at this time; negative urinalysis, chest x-ray no acute cardiopulmonary disease, no lactic acidosis                               SBIRT 20yo+    Flowsheet Row Most Recent Value   SBIRT (23 yo +)    In order to provide better care to our patients, we are screening all of our patients for alcohol and drug use  Would it be okay to ask you these screening questions?  Unable to answer at this time Filed at: 01/28/2023 2121                    Medical Decision Making  Patient is a 49-year-old female with a history of asthma, dysrhythmia no seen past surgical history the presents emerged part with potation's for 1 day  Patient presents with her mother this evening and provides part of patient history  Patient states that "I have been feeling funny all day, and tingly "    Patient tachycardic initially in the 140s improved with treatment down to the 110's  Leukocytosis of 19, no lactic acidosis  ECG with sinus tachycardia, sinus arrhythmia on monitor  Negative troponin doubt ACS; D-dimer negative, doubt PE  Chest x-ray no acute, pulmonary disease on initial read  Abdomen pelvis with impression of right ovary larger than left there is approximately 4 x 2 3 cm hypodense area on the right ovary possibly a right ovarian cyst with ultrasound commended by radiologist  Transvaginal ultrasound negative for ovarian torsion  Urinalysis negative for urinary tract infection  Negative urine pregnancy   Patient continued to feel "tingly, not feel right", at time of final evaluation  OB/GYN resident with evaluation at patient at bedside with no immediate intervention at this time  Discussed patient case with spencer Crouch and both agreed to place patient on inpatient observational status in the care of Dr Carlos Jay, Internal Medicine  Patient with verbal understanding of all clinical laboratory and imaging findings, verbalize agreement patient current treatment plan with teach back      Dysrhythmia: acute illness or injury  Enlarged ovary: self-limited or minor problem  Leukocytosis: acute illness or injury  Amount and/or Complexity of Data Reviewed  Labs: ordered  Decision-making details documented in ED Course  Radiology: ordered and independent interpretation performed  Risk  Prescription drug management  Decision regarding hospitalization            Disposition  Final diagnoses:   Enlarged ovary   Dysrhythmia Leukocytosis     Time reflects when diagnosis was documented in both MDM as applicable and the Disposition within this note     Time User Action Codes Description Comment    1/29/2023  3:05 AM Alejandro Philip Add [N83 8] Enlarged ovary     1/29/2023  3:51 AM [place] Philip Add [I49 9] Dysrhythmia     1/29/2023  3:51 AM Alejandro Philip Add [D03 [id number]] Leukocytosis       ED Disposition     ED Disposition   Admit    Condition   Stable    Date/Time   Sun Jan 29, 2023  3:50 AM    Comment   Case was discussed with Chuy Blum and the patient's admission status was agreed to be Admission Status: observation status to the service of Pam Murray           Follow-up Information    None         Current Discharge Medication List      CONTINUE these medications which have NOT CHANGED    Details   acetaminophen (TYLENOL) 325 mg tablet Take 3 tablets (975 mg total) by mouth every 6 (six) hours  Refills: 0    Associated Diagnoses: Ovarian torsion      albuterol (PROVENTIL HFA,VENTOLIN HFA) 90 mcg/act inhaler INHALE 2 PUFFS EVERY 6 HOURS AS NEEDED FOR WHEEZING  Qty: 8 g, Refills: 1    Associated Diagnoses: Mild intermittent asthma with acute exacerbation      ALBUTEROL SULFATE IN Inhale 1 Inhaler every 6 (six) hours as needed      etonogestrel (NEXPLANON) subdermal implant Inject 68 mg under the skin continuous      FLUoxetine (PROzac) 10 MG tablet Take 1 tablet (10 mg total) by mouth daily  Qty: 90 tablet, Refills: 1    Associated Diagnoses: Dysthymia      montelukast (SINGULAIR) 10 mg tablet Take 1 tablet (10 mg total) by mouth daily at bedtime  Qty: 30 tablet, Refills: 5    Associated Diagnoses: Mild intermittent asthma with acute exacerbation;  Wheezes      ondansetron (ZOFRAN) 4 mg tablet Take 1 tablet (4 mg total) by mouth every 8 (eight) hours as needed for nausea or vomiting  Qty: 10 tablet, Refills: 0    Associated Diagnoses: Pelvic pain      pantoprazole (PROTONIX) 20 mg tablet TAKE 1 TABLET BY MOUTH DAILY BEFORE BREAKFAST  Qty: 90 tablet, Refills: 2    Associated Diagnoses: Gastroesophageal reflux disease with esophagitis without hemorrhage             No discharge procedures on file      PDMP Review       Value Time User    PDMP Reviewed  Yes 11/15/2022  5:18 PM Boogie Villeda MD          ED Provider  Electronically Signed by           Tomy Bryant PA-C  01/29/23 0383

## 2023-01-29 NOTE — ASSESSMENT & PLAN NOTE
9 Patient Presentation: Patient presents with complaints of palpitations, chest tightness and shortness of breath that started last evening  Patient notes heart rates to be in 170s at home prior to arrival  Patient reports associated bilateral hand tingling and left facial tingling  Likely etiology: Anxiety versus Viral infectious source versus cardiac arrhthymia   Initial Troponin: 13  Trend x3 or to peak  Initial EKG: Sinus tachycardia, heart rate 139  Monitor on telemetry  · TSH 2 576  Check fasting lipid panel and A1c  Trial Atarax prn for anxiety  Admit under Observation Status

## 2023-01-29 NOTE — ED NOTES
Patient's mother requesting blood cultures be sent  Provider Joyce Rhoades made aware        Author Kuldip RN  01/29/23 8957

## 2023-01-29 NOTE — H&P
[address] [place] 2000, 20 y o  female MRN: [id number]  Unit/Bed#: S -01 Encounter: [id number]  Primary Care Provider: Carina Bundy   Date and time admitted to hospital: 1/28/2023  9:15 PM    * Palpitations  Assessment & Plan  Patient Presentation: Patient presents with complaints of palpitations, chest tightness and shortness of breath that started last evening  Patient notes heart rates to be in 170s at home prior to arrival  Patient reports associated bilateral hand tingling and left facial tingling  Likely etiology: Anxiety versus Viral infectious source versus cardiac arrhthymia   Initial Troponin: 13  Trend x3 or to peak  Initial EKG: Sinus tachycardia, heart rate 139  Monitor on telemetry  · TSH 2 576  Check fasting lipid panel and A1c  Trial Atarax prn for anxiety  Admit under Observation Status  SIRS (systemic inflammatory response syndrome) (HCC)  Assessment & Plan  • SIRS criteria: Tachcardia, Leukocytosis with WBCs 19  • Suspected source: No infectious source identified  Suspect reactive  • UA: trace protein  • CT A/P: "The right ovary is larger than the left and there is an approximately 4 x 2 3 cm hypodense area on the right ovary, possibly a right ovarian cyst "  • Of note, patient reports that she was positive for COVID on 01/09/2023  • Lactic acid: 1 9  • End organ damage: None  • IV Fluids: Received 2 L of NSS in ED  • Monitor off antibiotics as this time  • Monitor vital signs, laboratory studies  Right ovarian cyst  Assessment & Plan  · CT A/P: "The right ovary is larger than the left and there is an approximately 4 x 2 3 cm hypodense area on the right ovary, possibly a right ovarian cyst "  · Evaluated by GYN in ED  · Transvaginal US: "No evidence of ovarian torsion "  · Continue outpatient follow up  Mild intermittent asthma  Assessment & Plan  · Not in an acute exacerbation  · Continue Proventil prn      Obesity (BMI 30 0-34  9)  Assessment & Plan  · Encouraged lifestyle modifications  VTE Pharmacologic Prophylaxis: VTE Score: 1 Low Risk (Score 0-2) - Encourage Ambulation  Code Status: Level 1 - Full Code   Discussion with family: Updated  (mother) at bedside  Anticipated Length of Stay: Patient will be admitted on an observation basis with an anticipated length of stay of less than 2 midnights secondary to SIRS  Total Time for Visit, including Counseling / Coordination of Care: 70 minutes Greater than 50% of this total time spent on direct patient counseling and coordination of care  Chief Complaint: Palpitations     History of Present Illness:  Leonidas Oshea is a 20 y o  female with a PMH of asthma, obesity who presents with complaints of palpitations, chest tightness and shortness of breath that started last evening  Patient notes heart rates to be in 170s at home prior to arrival  Patient reports associated bilateral hand tingling and left facial tingling  Patient's mother at bedside reports that patient exhibited behaviors of "impending doom" upon transport to the hospital  She denies fever, nausea, vomiting, arthralgias,     Of note, patient reports that she was positive for COVID on 01/09/2023  Upon evaluation in the ED, patient met SIRS criteria on arrival based on tachycardia in 130s and leukocytosis of 19  Patient will be admitted for observation  Review of Systems:  Review of Systems   Constitutional: Negative for fever  Respiratory: Positive for chest tightness and shortness of breath  Negative for wheezing  Cardiovascular: Positive for palpitations  Gastrointestinal: Negative for abdominal pain, nausea and vomiting  Neurological: Positive for dizziness, light-headedness, numbness (Bilateral hand, left face) and headaches  All other systems reviewed and are negative        Past Medical and Surgical History:   Past Medical History:   Diagnosis Date   • Asthma    • Dysthymia 5/15/2019   • GERD (gastroesophageal reflux disease) 1/29/2023       Past Surgical History:   Procedure Laterality Date   • NO PAST SURGERIES     • DE LAPS ABD PRTM&OMENTUM DX W/WO SPEC BR/WA SPX Right 11/15/2022    Procedure: LAPAROSCOPY DIAGNOSTIC, detorsion of right ovary;  Surgeon: Becky Jewell MD;  Location: [facility];  Service: Gynecology       Meds/Allergies:  Prior to Admission medications    Medication Sig Start Date End Date Taking? Authorizing Provider   acetaminophen (TYLENOL) 325 mg tablet Take 3 tablets (975 mg total) by mouth every 6 (six) hours 11/15/22  Yes Becky Jewell MD   albuterol (PROVENTIL HFA,VENTOLIN HFA) 90 mcg/act inhaler INHALE 2 PUFFS EVERY 6 HOURS AS NEEDED FOR WHEEZING 11/8/22  Yes NOEL Maldonado   ALBUTEROL SULFATE IN Inhale 1 Inhaler every 6 (six) hours as needed   Yes Historical Provider, MD   etonogestrel (NEXPLANON) subdermal implant Inject 68 mg under the skin continuous   Yes Historical Provider, MD   FLUoxetine (PROzac) 10 MG tablet Take 1 tablet (10 mg total) by mouth daily 11/23/22  Yes NOEL Maldonado   montelukast (SINGULAIR) 10 mg tablet Take 1 tablet (10 mg total) by mouth daily at bedtime  Patient not taking: Reported on 1/28/2023 9/19/19   NOEL Gage   ondansetron [facility]) 4 mg tablet Take 1 tablet (4 mg total) by mouth every 8 (eight) hours as needed for nausea or vomiting  Patient not taking: Reported on 1/28/2023 11/15/22   Becky Jewell MD   pantoprazole (PROTONIX) 20 mg tablet TAKE 1 TABLET BY MOUTH DAILY BEFORE BREAKFAST  Patient not taking: Reported on 1/28/2023 9/20/22   NOEL Gage     I have reviewed home medications with patient personally      Allergies: No Known Allergies    Social History:  Marital Status: Single   Occupation: Unknown  Patient Pre-hospital Living Situation: Home, With spouse  Patient Pre-hospital Level of Mobility: walks  Patient Pre-hospital Diet Restrictions: None  Substance Use History: Social History     Substance and Sexual Activity   Alcohol Use No     Social History     Tobacco Use   Smoking Status Never   Smokeless Tobacco Never     Social History     Substance and Sexual Activity   Drug Use No       Family History:  Family History   Problem Relation Age of Onset   • Breast cancer Other    • No Known Problems Mother    • No Known Problems Father        Physical Exam:     Vitals:   Blood Pressure: 128/75 (01/29/23 0428)  Pulse: 99 (01/29/23 0428)  Temperature: 98 6 °F (37 °C) (01/29/23 0428)  Temp Source: Oral (01/29/23 0428)  Respirations: 18 (01/29/23 0428)  Height: 5' 6" (167 6 cm) (01/28/23 2119)  Weight - Scale: 97 5 kg (215 lb) (01/28/23 2119)  SpO2: 97 % (01/29/23 0428)    Physical Exam  Vitals and nursing note reviewed  Constitutional:       General: She is not in acute distress  Appearance: She is not ill-appearing  HENT:      Head: Normocephalic  Nose: Nose normal       Mouth/Throat:      Pharynx: Oropharynx is clear  Eyes:      General: No scleral icterus  Conjunctiva/sclera: Conjunctivae normal    Cardiovascular:      Rate and Rhythm: Regular rhythm  Tachycardia present  Pulses: Normal pulses  Heart sounds: Normal heart sounds  Pulmonary:      Effort: Pulmonary effort is normal  No respiratory distress  Breath sounds: Normal breath sounds  No wheezing, rhonchi or rales  Chest:      Chest wall: No tenderness  Abdominal:      General: Bowel sounds are normal  There is no distension  Palpations: Abdomen is soft  Tenderness: There is no abdominal tenderness  Musculoskeletal:         General: Normal range of motion  Cervical back: Normal range of motion  Skin:     General: Skin is warm and dry  Neurological:      General: No focal deficit present  Mental Status: She is alert and oriented to person, place, and time  Cranial Nerves: No dysarthria or facial asymmetry  Motor: No weakness     Psychiatric: Speech: Speech normal           Additional Data:     Lab Results:  Results from last 7 days   Lab Units 01/29/23  0452   WBC Thousand/uL 13 35*   HEMOGLOBIN g/dL 12 1   HEMATOCRIT % 37 2   PLATELETS Thousands/uL 330   NEUTROS PCT % 74   LYMPHS PCT % 17   MONOS PCT % 8   EOS PCT % 0     Results from last 7 days   Lab Units 01/29/23  0452 01/28/23  2120   SODIUM mmol/L 136 135   POTASSIUM mmol/L 3 5 3 6   CHLORIDE mmol/L 107 102   CO2 mmol/L 22 21   BUN mg/dL 7 8   CREATININE mg/dL 0 56* 1 11   ANION GAP mmol/L 7 12   CALCIUM mg/dL 8 2* 9 4   ALBUMIN g/dL  --  4 7   TOTAL BILIRUBIN mg/dL  --  0 45   ALK PHOS U/L  --  66   ALT U/L  --  48   AST U/L  --  20   GLUCOSE RANDOM mg/dL 102 144*     Results from last 7 days   Lab Units 01/28/23  2120   INR  0 99             Results from last 7 days   Lab Units 01/29/23  0452 01/28/23  2221   LACTIC ACID mmol/L  --  1 9   PROCALCITONIN ng/ml <0 05  --        Lines/Drains:  Invasive Devices     Peripheral Intravenous Line  Duration           Peripheral IV 01/28/23 Left;Proximal;Ventral (anterior) Forearm <1 day                   Imaging: Reviewed radiology reports from this admission including: abdominal/pelvic CT and ultrasound(s) and Personally reviewed the following imaging: chest xray  US pelvis complete w transvaginal   ED Interpretation by Jimena Ojeda PA-C (01/29 0308)   [facility]   [phone] 1/29/2023     Narrative & Impression  PELVIC ULTRASOUND, COMPLETE     INDICATION:  The patient is 20years old  Rule out right ovarian torsion      COMPARISON: Multiple priors most recently CT 1/20/2023     TECHNIQUE:   Transabdominal pelvic ultrasound was performed in sagittal and transverse planes with a curvilinear transducer  Additional transvaginal imaging was performed to better evaluate the endometrium and ovaries    Imaging included volumetric   sweeps as well as traditional still imaging technique      FINDINGS:     UTERUS:  The uterus is retroverted in position, measuring 6 9 x 3 2 x 4 0 cm transvaginally  The uterus has a normal contour and echotexture  The cervix appears within normal limits      ENDOMETRIUM:    The endometrial echo complex has an AP caliber of 4 0 mm  Its appearance is within normal limits for age and cycle and shows no filling defects        OVARIES/ADNEXA:  Right ovary:  3 8 x 3 1 x 4 0 cm  25 0 mL  Left ovary:  3 7 x    1 7 x 2 3 cm  7 7 mL  Ovarian Doppler flow is within normal limits  No suspicious ovarian or adnexal abnormality  Right ovarian simple cyst measuring up to 3 2 cm     OTHER:  No free fluid or loculated fluid collections      IMPRESSION:     No evidence of ovarian torsion at time of examination      Workstation performed: [id number]        Final Result by Clarke Simmons DO (01/29 0304)       No evidence of ovarian torsion at time of examination  Workstation performed: [id number]         CT abdomen pelvis with contrast   ED Interpretation by Lorraine Roe PA-C (01/29 0207)   Kerrie Kevin MD  [phone] 1/29/2023 STAT    Narrative & Impression  CT ABDOMEN AND PELVIS WITH IV CONTRAST     INDICATION:   Abdominal pain, acute, nonlocalized  right flank pain and suprapubic pain  Leukocytosis  Prior history of right ovarian torsion      COMPARISON:  CT dated November 14, 2022, pelvic ultrasound reports dated November 14 and 15, 2022, operative report dated November 15, 2022      TECHNIQUE:  CT examination of the abdomen and pelvis was performed  Axial, sagittal, and coronal 2D reformatted images were created from the source data and submitted for interpretation      Radiation dose length product (DLP) for this visit:  467 mGy-cm     This examination, like all CT scans performed in the [facility], was performed utilizing techniques to minimize radiation dose exposure, including the use of iterative   reconstruction and automated exposure control      IV Contrast:  100 mL of iohexol (OMNIPAQUE)  Enteric Contrast:  Enteric contrast was n   ot administered      FINDINGS:     ABDOMEN     LOWER CHEST:  A 5 mm peripherally oriented pulmonary nodule within the posterior aspect of the right lower lobe of the lung (series 301 image 2) is unchanged compared with November 14, 2022      LIVER/BILIARY TREE:  Unremarkable      GALLBLADDER:  No calcified gallstones  No pericholecystic inflammatory change      SPLEEN:  Unremarkable      PANCREAS:  Unremarkable      ADRENAL GLANDS:  Unremarkable      KIDNEYS/URETERS:  Unremarkable  No hydronephrosis      STOMACH AND BOWEL:  Unremarkable      APPENDIX:  A normal appendix was visualized      ABDOMINOPELVIC CAVITY:  No ascites  No pneumoperitoneum  No lymphadenopathy      VESSELS:  Unremarkable for patient's age      PELVIS     REPRODUCTIVE ORGANS:  The right ovary is larger than the left  There is an approximately 4 x 2 3 cm hypodense area on the right ovary, possibly a right ovarian cyst      URINARY BLADDER:  Unremarkable      ABDOMINAL WALL/INGUINAL REGIONS:  Unremarkable      OSS   EOUS STRUCTURES:  No acute fracture or destructive osseous lesion      IMPRESSION:     The right ovary is larger than the left and there is an approximately 4 x 2 3 cm hypodense area on the right ovary, possibly a right ovarian cyst   Pelvic ultrasound is recommended for further evaluation      This examination demonstrates findings for which imaging follow-up is recommended and was logged as such in [address] [place] [address]      The study was marked in EPIC for immediate notification            Workstation performed: [id number]           Final Result by Kb Fry MD (01/29 0200)      The right ovary is larger than the left and there is an approximately 4 x 2 3 cm hypodense area on the right ovary, possibly a right ovarian cyst   Pelvic ultrasound is recommended for further evaluation        This examination demonstrates findings for which imaging follow-up is recommended and was logged as such in [facility] [address]  The study was marked in [facility] for immediate notification  Workstation performed: [id number]         XR chest 1 view portable   ED Interpretation by Erwin Saldaña PA-C (01/28 2234)   No acute cardiopulmonary disease on initial read          EKG and Other Studies Reviewed on Admission:   · EKG: Sinus Tachycardia    ** Please Note: This note has been constructed using a voice recognition system   **

## 2023-01-29 NOTE — DISCHARGE INSTR - AVS FIRST PAGE
Dear Jamar De Leon,     It was our pleasure to care for you here at [facility]  It is our hope that we were always able to exceed the expected standards for your care during your stay  You were hospitalized due to palpitations  You were cared for on the [facility] third floor under the service of Osei Cardoza MD with the [facility] who covers for your primary care physician (PCP), Nilam 2, while you were hospitalized  If you have any questions or concerns related to this hospitalization, you may contact us at [phone]  For follow up as well as medication refills, we recommend that you follow up with your primary care physician  A registered nurse will reach out to you by phone within a few days after your discharge to answer any additional questions that you may have after going home  However, at this time we provide for you here, the most important instructions / recommendations at discharge:       Notable Medication Adjustments -   None    Testing Required after Discharge -   None    Important follow up information -   Low up with your primary care physician in 1 week    Please review this entire after visit summary as additional general instructions including medication list, appointments, activity, diet, any pertinent wound care, and other additional recommendations from your care team that may be provided for you        Sincerely,     Osei Cardoza MD

## 2023-01-29 NOTE — CONSULTS
Consultation - Gynecology  [facility] 20 y o  female MRN: [id number]  Unit/Bed#: ED-39 Encounter: [id number]      Inpatient consult to Obstetrics / Gynecology  Consult performed by: Scott Cornell MD  Consult ordered by: Yenni Torres PA-C          Chief Complaint   Patient presents with   • Chest Pain     Patient reports noting SOB, dizziness, b/l numbness, chest pain/palpitations starting this evening  History of Present Illness   Physician Requesting Consult: Enma Hunt MD    HPI: [facility] is a 20 y o  [facility] female who initially presented to the ED with complaint of palpitations that started suddenly today at home  Patient and her family (mom, aunt at bedside, both are nurses for a living) states this has never happened to her before  Denies any inciting/triggering factors  She reports associated pressure headache and dizziness, as well as tingling in her upper extremities  She denies vision changes or problems with gait  She denies chest pain, SOB, fever, chills  Patient did test positive for COVID almost 2 weeks ago  Did have fevers at that time  Currently denies productive cough  Due to unclear etiology of palpitations, patent underwent broad workup in the ED, which was largely unremarkable except for a WBC of 19  CT imaging showed presence of enlarged 3 cm right ovarian cyst  Patient's history is significant for right ovarian torsion in Nov 2022 that required admission for observation/management of pain and ultimately de-torsion via laparoscopy  Patient was discharged in stable condition at that time  States the earliest appointment she could receive for follow up was in Feb 2023  Since discharge home from the hospital in Nov 2022, patient has overall felt well; denies feeling the severe pain she had at the time of her torsion  She is currently sexually active with her   Denies concern for STI's, states recent testing was negative   Patient has had Nexplanon since 8/26/2019, has irregular bleeding with it  Prior to Nexplanon her menses were regular  She understands her Nexplanon is considered an ineffective form of contraception at this time as it is past the 3 year window  She plans to switch to low dose OCP once the Nexplanon is removed  She cannot remember her LMP  She currently denies vaginal bleeding and abnormal vaginal discharge  Her bowel movements are normal  She is voiding without difficulty  Imaging was concerning for ovarian torsion due to patient' history  A follow up pelvic ultrasound was obtained  Review of Systems   All other systems reviewed and are negative  Historical Information   Past Medical History:   Diagnosis Date   • Asthma    • Dysthymia 5/15/2019     Past Surgical History:   Procedure Laterality Date   • NO PAST SURGERIES     • SC LAPS ABD PRTM&OMENTUM DX W/WO SPEC BR/WA SPX Right 11/15/2022    Procedure: LAPAROSCOPY DIAGNOSTIC, detorsion of right ovary;  Surgeon: Boogie Villeda MD;  Location: [facility];  Service: Gynecology     OB History    Para Term  AB Living   0 0 0 0 0 0   SAB IAB Ectopic Multiple Live Births   0 0 0 0 0     Family History   Problem Relation Age of Onset   • Breast cancer Other    • No Known Problems Mother    • No Known Problems Father      Social History   Social History     Substance and Sexual Activity   Alcohol Use No     Social History     Substance and Sexual Activity   Drug Use No     Social History     Tobacco Use   Smoking Status Never   Smokeless Tobacco Never       Meds/Allergies   No current facility-administered medications for this encounter  No Known Allergies    Objective   Vitals: Blood pressure 121/58, pulse (!) 111, temperature 98 9 °F (37 2 °C), temperature source Oral, resp  rate 18, height 5' 6" (1 676 m), weight 97 5 kg (215 lb), SpO2 99 %, not currently breastfeeding  Body mass index is 34 7 kg/m²        Intake/Output Summary (Last 24 hours) at 2023 0403  Last data filed at 1/29/2023 0144  Gross per 24 hour   Intake 2000 ml   Output --   Net 2000 ml       Invasive Devices     Peripheral Intravenous Line  Duration           Peripheral IV 01/28/23 Left;Proximal;Ventral (anterior) Forearm <1 day                Physical Exam  Constitutional:       General: She is not in acute distress  Appearance: She is obese  She is not ill-appearing or diaphoretic  HENT:      Head: Normocephalic and atraumatic  Eyes:      Conjunctiva/sclera: Conjunctivae normal    Cardiovascular:      Rate and Rhythm: Normal rate  Pulmonary:      Effort: Pulmonary effort is normal  No respiratory distress  Abdominal:      General: There is no distension  Palpations: Abdomen is soft  Tenderness: There is no abdominal tenderness  Genitourinary:     Comments: Deferred   Musculoskeletal:         General: No tenderness  Normal range of motion  Cervical back: Normal range of motion  Skin:     General: Skin is warm and dry  Capillary Refill: Capillary refill takes less than 2 seconds  Coloration: Skin is not pale  Neurological:      General: No focal deficit present  Mental Status: She is alert and oriented to person, place, and time  Mental status is at baseline  Psychiatric:         Mood and Affect: Mood normal          Behavior: Behavior normal          Thought Content:  Thought content normal          Lab Results:   Recent Results (from the past 24 hour(s))   CBC and differential    Collection Time: 01/28/23  9:20 PM   Result Value Ref Range    WBC 19 78 (H) 4 31 - 10 16 Thousand/uL    RBC 4 85 3 81 - 5 12 Million/uL    Hemoglobin 14 1 11 5 - 15 4 g/dL    Hematocrit 43 2 34 8 - 46 1 %    MCV 89 82 - 98 fL    MCH 29 1 26 8 - 34 3 pg    MCHC 32 6 31 4 - 37 4 g/dL    RDW 12 6 11 6 - 15 1 %    MPV 9 9 8 9 - 12 7 fL    Platelets 704 (H) 299 - 390 Thousands/uL    nRBC 0 /100 WBCs    Neutrophils Relative 78 (H) 43 - 75 %    Immat GRANS % 0 0 - 2 %    Lymphocytes Relative 16 14 - 44 %    Monocytes Relative 6 4 - 12 %    Eosinophils Relative 0 0 - 6 %    Basophils Relative 0 0 - 1 %    Neutrophils Absolute 15 26 (H) 1 85 - 7 62 Thousands/µL    Immature Grans Absolute 0 08 0 00 - 0 20 Thousand/uL    Lymphocytes Absolute 3 18 0 60 - 4 47 Thousands/µL    Monocytes Absolute 1 17 0 17 - 1 22 Thousand/µL    Eosinophils Absolute 0 02 0 00 - 0 61 Thousand/µL    Basophils Absolute 0 07 0 00 - 0 10 Thousands/µL   Comprehensive metabolic panel    Collection Time: 01/28/23  9:20 PM   Result Value Ref Range    Sodium 135 135 - 147 mmol/L    Potassium 3 6 3 5 - 5 3 mmol/L    Chloride 102 96 - 108 mmol/L    CO2 21 21 - 32 mmol/L    ANION GAP 12 4 - 13 mmol/L    BUN 8 5 - 25 mg/dL    Creatinine 1 11 0 60 - 1 30 mg/dL    Glucose 144 (H) 65 - 140 mg/dL    Calcium 9 4 8 4 - 10 2 mg/dL    AST 20 13 - 39 U/L    ALT 48 7 - 52 U/L    Alkaline Phosphatase 66 34 - 104 U/L    Total Protein 7 9 6 4 - 8 4 g/dL    Albumin 4 7 3 5 - 5 0 g/dL    Total Bilirubin 0 45 0 20 - 1 00 mg/dL    eGFR 69 ml/min/1 73sq m   Protime-INR    Collection Time: 01/28/23  9:20 PM   Result Value Ref Range    Protime 13 3 11 6 - 14 5 seconds    INR 0 99 0 84 - 1 19   APTT    Collection Time: 01/28/23  9:20 PM   Result Value Ref Range    PTT 24 23 - 37 seconds   TSH, 3rd generation with Free T4 reflex    Collection Time: 01/28/23  9:20 PM   Result Value Ref Range    TSH 3RD GENERATON 2 576 0 450 - 4 500 uIU/mL   Magnesium    Collection Time: 01/28/23  9:20 PM   Result Value Ref Range    Magnesium 1 9 1 9 - 2 7 mg/dL   D-dimer, quantitative    Collection Time: 01/28/23  9:20 PM   Result Value Ref Range    D-Dimer, Quant 0 29 <0 50 ug/ml FEU   B-Type Natriuretic Peptide(BNP)    Collection Time: 01/28/23  9:20 PM   Result Value Ref Range    BNP 48 0 - 100 pg/mL   ECG 12 lead    Collection Time: 01/28/23  9:20 PM   Result Value Ref Range    Ventricular Rate 139 BPM    Atrial Rate 139 BPM    TX Interval 140 ms    QRSD Interval 78 ms    QT Interval 280 ms    QTC Interval 426 ms    P [place] 64 degrees    QRS Axis 54 degrees    T Wave Axis 37 degrees   Lactic acid, plasma    Collection Time: 01/28/23 10:21 PM   Result Value Ref Range    LACTIC ACID 1 9 0 5 - 2 0 mmol/L   HS Troponin 0hr (reflex protocol)    Collection Time: 01/28/23 10:59 PM   Result Value Ref Range    hs TnI 0hr 13 "Refer to ACS Flowchart"- see link ng/L   UA w Reflex to Microscopic w Reflex to Culture    Collection Time: 01/28/23 10:59 PM    Specimen: Urine, Clean Catch   Result Value Ref Range    Color, UA Light Yellow     Clarity, UA Clear     Specific [place], UA 1 015 1 003 - 1 030    pH, UA 7 0 4 5, 5 0, 5 5, 6 0, 6 5, 7 0, 7 5, 8 0    Leukocytes, UA Negative Negative    Nitrite, UA Negative Negative    Protein, UA Trace (A) Negative mg/dl    Glucose, UA Negative Negative mg/dl    Ketones, UA Negative Negative mg/dl    Urobilinogen, UA <2 0 <2 0 mg/dl mg/dl    Bilirubin, UA Negative Negative    Occult Blood, UA Negative Negative   Urine Microscopic    Collection Time: 01/28/23 10:59 PM   Result Value Ref Range    RBC, UA 1-2 None Seen, 1-2 /hpf    WBC, UA 1-2 None Seen, 1-2 /hpf    Epithelial Cells Occasional None Seen, Occasional /hpf    Bacteria, UA None Seen None Seen, Occasional /hpf    Hyaline Casts, UA 5-10 (A) None Seen /lpf   POCT pregnancy, urine    Collection Time: 01/28/23 11:06 PM   Result Value Ref Range    EXT Preg Test, Ur Negative     Control Valid        Imaging:    CT abdomen pelvis with contrast  IMPRESSION:   The right ovary is larger than the left and there is an approximately 4 x 2 3 cm hypodense area on the right ovary, possibly a right ovarian cyst   Pelvic ultrasound is recommended for further evaluation        This examination demonstrates findings for which imaging follow-up is recommended and was logged as such in [address] [place] [address]        The study was marked in EPIC for immediate notification       US pelvis complete w transvaginal  IMPRESSION:   No evidence of ovarian torsion at time of examination  Imaging Studies: I have personally reviewed pertinent reports  EKG, Pathology, and Other Studies: I have personally reviewed pertinent reports  Assessment/Plan     Assessment:  Amie Waters is a 20 y o   female who presents with palpitations, found to have tachycardia, elevated WBC, and 3 cm simple-appearing, fluid-filled right ovarian cyst on imaging  No free fluid  Normal flow to both ovaries seen  The patient is stable and does no have a surgical abdomen  Her cyst is small and simple in appearance; conservative management with pain control and supportive care is first line  Simple ovarian cysts will usually resolve spontaneously over time  The patient was counseled on imaging findings  The patient and her family state they were "honestly not even worried about the cyst " They are primarily concerned with the patient's tachycardia and her overall not feeling well  I offered a pelvic exam, patient politely declined as she is having minimal pain  Plan:  - Agree with SLIM consultation to further determine etiology of tachycardia and elevated WBC   - F/u blood cultures  - R ovarian cyst: supportive care with routine meds; ok for Tylenol, Motrin, Catie, etc  Patient does not have a surgical abdomen  Can expect cyst to resolve over time  Does not appear to be an infectious source either, based on minimal pelvic pain, and benign characteristics of the cyts on ultrasound  - Ovarian torsion precautions provided   - Contraception: plan for Nexplanon removal in office with transition to low dose OCP  Code Status: Prior    Counseling / Coordination of Care  Total floor / unit time spent today20 minutes  minutes  Greater than 50% of total time was spent with the patient and / or family counseling and / or coordination of care       Heriberto Johnson MD  2023  4:03 AM

## 2023-01-29 NOTE — ASSESSMENT & PLAN NOTE
• SIRS criteria: Tachcardia, Leukocytosis with WBCs 19  • Suspected source: No infectious source identified  Suspect reactive  • UA: trace protein  • CT A/P: "The right ovary is larger than the left and there is an approximately 4 x 2 3 cm hypodense area on the right ovary, possibly a right ovarian cyst "  • Of note, patient reports that she was positive for COVID on 01/09/2023  • Lactic acid: 1 9  • End organ damage: None  • IV Fluids: Received 2 L of NSS in ED  • Monitor off antibiotics as this time  • Monitor vital signs, laboratory studies

## 2023-01-29 NOTE — ED NOTES
Patient reports ambulating to bathroom and still feeling dizzy  Ambulated with steady gait   Describes feeling "dizzy and jittery "        Bossman Zarco RN  01/29/23 [id number]

## 2023-01-29 NOTE — ED NOTES
Patient ambulated to restroom with assist of mom and steady gait   Reporting still dizzy     Bossman Zarco RN  01/29/23 [id number]

## 2023-01-29 NOTE — PLAN OF CARE
Problem: Potential for Falls  Goal: Patient will remain free of falls  Description: INTERVENTIONS:  - Educate patient/family on patient safety including physical limitations  - Instruct patient to call for assistance with activity   - Consult OT/PT to assist with strengthening/mobility   - Keep Call bell within reach  - Keep bed low and locked with side rails adjusted as appropriate  - Keep care items and personal belongings within reach  - Initiate and maintain comfort rounds  - Make Fall Risk Sign visible to staff  - Offer Toileting every 2 Hours, in advance of need  - Apply yellow socks and bracelet for high fall risk patients  - Consider moving patient to room near nurses station  Outcome: Progressing     Problem: INFECTION - ADULT  Goal: Absence or prevention of progression during hospitalization  Description: INTERVENTIONS:  - Assess and monitor for signs and symptoms of infection  - Monitor lab/diagnostic results  - Monitor all insertion sites, i e  indwelling lines, tubes, and drains  - Monitor endotracheal if appropriate and nasal secretions for changes in amount and color  - [place] appropriate cooling/warming therapies per order  - Administer medications as ordered  - Instruct and encourage patient and family to use good hand hygiene technique  - Identify and instruct in appropriate isolation precautions for identified infection/condition  Outcome: Progressing     Problem: Knowledge Deficit  Goal: Patient/family/caregiver demonstrates understanding of disease process, treatment plan, medications, and discharge instructions  Description: Complete learning assessment and assess knowledge base    Interventions:  - Provide teaching at level of understanding  - Provide teaching via preferred learning methods  Outcome: Progressing

## 2023-01-30 ENCOUNTER — OFFICE VISIT (OUTPATIENT)
Dept: FAMILY MEDICINE CLINIC | Facility: CLINIC | Age: 23
End: 2023-01-30

## 2023-01-30 VITALS
WEIGHT: 191.4 LBS | TEMPERATURE: 98.4 F | DIASTOLIC BLOOD PRESSURE: 80 MMHG | HEART RATE: 111 BPM | HEIGHT: 66 IN | OXYGEN SATURATION: 98 % | SYSTOLIC BLOOD PRESSURE: 120 MMHG | RESPIRATION RATE: 16 BRPM | BODY MASS INDEX: 30.76 KG/M2

## 2023-01-30 DIAGNOSIS — U09.9 POST COVID-19 CONDITION, UNSPECIFIED: ICD-10-CM

## 2023-01-30 DIAGNOSIS — D72.829 LEUKOCYTOSIS, UNSPECIFIED TYPE: ICD-10-CM

## 2023-01-30 DIAGNOSIS — R00.2 PALPITATIONS: Primary | ICD-10-CM

## 2023-01-30 DIAGNOSIS — F41.9 ANXIETY: ICD-10-CM

## 2023-01-30 RX ORDER — BUSPIRONE HYDROCHLORIDE 5 MG/1
5 TABLET ORAL 2 TIMES DAILY
Qty: 60 TABLET | Refills: 5 | Status: SHIPPED | OUTPATIENT
Start: 2023-01-30

## 2023-01-30 NOTE — PATIENT INSTRUCTIONS
Take buspar every 8 hrs as needed  Xanax for acute episodes of anxiety  Get holter monitor, stress test and pulmonary function test  Heart Palpitations   WHAT YOU NEED TO KNOW:   Heart palpitations are feelings that your heart races, jumps, throbs, or flutters  You may feel extra beats, no beats for a short time, or skipped beats  You may have these feelings in your chest, throat, or neck  They may happen when you are sitting, standing, or lying  Heart palpitations may be frightening, but are usually not caused by a serious problem  DISCHARGE INSTRUCTIONS:   Call 911 or have someone else call for any of the following: You have any of the following signs of a heart attack:      Squeezing, pressure, or pain in your chest    You may  also have any of the following:     Discomfort or pain in your back, neck, jaw, stomach, or arm    Shortness of breath    Nausea or vomiting    Lightheadedness or a sudden cold sweat    You have any of the following signs of a stroke:      Numbness or drooping on one side of your face     Weakness in an arm or leg    Confusion or difficulty speaking    Dizziness, a severe headache, or vision loss    You faint or lose consciousness  Return to the emergency department if:   Your palpitations happen more often or get more intense  Contact your healthcare provider if:   You have new or worsening swelling in your feet or ankles  You have questions or concerns about your condition or care  Follow up with your healthcare provider as directed: You may need to follow up with a cardiologist  Brenden Enamorado may need tests to check for heart problems that cause palpitations  Write down your questions so you remember to ask them during your visits  Keep a record:  Write down when your palpitations start and stop, what you were doing when they started, and your symptoms  Keep track of what you ate or drank within a few hours of your palpitations   Include anything that seemed to help your symptoms, such as lying down or holding your breath  This record will help you and your healthcare provider learn what triggers your palpitations  Bring this record with you to your follow up visits  Help prevent heart palpitations:   Manage stress and anxiety  Find ways to relax such as listening to music, meditating, or doing yoga  Exercise can also help decrease stress and anxiety  Talk to someone you trust about your stress or anxiety  You can also talk to a therapist      Get plenty of sleep every night  Ask your healthcare provider how much sleep you need each night  Do not drink caffeine or alcohol  Caffeine and alcohol can make your palpitations worse  Caffeine is found in soda, coffee, tea, chocolate, and drinks that increase your energy  Do not smoke  Nicotine and other chemicals in cigarettes and cigars may damage your heart and blood vessels  Ask your healthcare provider for information if you currently smoke and need help to quit  E-cigarettes or smokeless tobacco still contain nicotine  Talk to your healthcare provider before you use these products  Do not use illegal drugs  Talk to your healthcare provider if you use illegal drugs and want help to quit  © Copyright [employer] 2022 Information is for End User's use only and may not be sold, redistributed or otherwise used for commercial purposes  All illustrations and images included in CareNotes® are the copyrighted property of A D A M , Inc  or [facility]   The above information is an  only  It is not intended as medical advice for individual conditions or treatments  Talk to your doctor, nurse or pharmacist before following any medical regimen to see if it is safe and effective for you

## 2023-01-30 NOTE — ASSESSMENT & PLAN NOTE
Patient has been having increased anxiety due to recent health concerns  Was given Xanax in the hospital   Is taking it as needed  Discussed addictive nature of this medication  Will prescribe BuSpar for mild to moderate anxiety  May take Xanax for acute episodes  Discussed self-care, discussed coping mechanisms    Addressed health concerns

## 2023-01-30 NOTE — ASSESSMENT & PLAN NOTE
Patient has shortness of breath, dizziness  Patient does have a history of asthma  We will get pulmonary function test done, stress test and Holter monitor done  Reji self-care    Increase good nutrition hydration rest

## 2023-01-30 NOTE — PROGRESS NOTES
Name: Mable Meeks      : 2000      MRN: [id number]  Encounter Provider: Larri Castleman, CRNP  Encounter Date: 2023   Encounter department: [address] [place] [address] Drive     1  Palpitations  Assessment & Plan:  Patient continues to be tachycardic  Generally not feeling well  Has been taking Xanax to help with anxiety but it is making her sleep a lot  Holter monitor ordered  Stress test ordered  Patient did have COVID about 3 weeks ago, possible post-COVID condition    Orders:  -     Echo stress test, exercise; Future; Expected date: 2023  -     Holter monitor; Future; Expected date: 2023  -     CBC and differential; Future  -     Comprehensive metabolic panel; Future    2  Post covid-19 condition, unspecified  Assessment & Plan:  Patient has shortness of breath, dizziness  Patient does have a history of asthma  We will get pulmonary function test done, stress test and Holter monitor done  Reji self-care  Increase good nutrition hydration rest    Orders:  -     Complete PFT with post bronchodilator; Future    3  Anxiety  Assessment & Plan:  Patient has been having increased anxiety due to recent health concerns  Was given Xanax in the hospital   Is taking it as needed  Discussed addictive nature of this medication  Will prescribe BuSpar for mild to moderate anxiety  May take Xanax for acute episodes  Discussed self-care, discussed coping mechanisms  Addressed health concerns    Orders:  -     busPIRone (BUSPAR) 5 mg tablet; Take 1 tablet (5 mg total) by mouth 2 (two) times a day    4  Leukocytosis, unspecified type  -     CBC and differential; Future      BMI Counseling: Body mass index is 30 89 kg/m²   The BMI is above normal  Nutrition recommendations include decreasing portion sizes, encouraging healthy choices of fruits and vegetables, decreasing fast food intake, consuming healthier snacks, limiting drinks that contain sugar, moderation in carbohydrate intake, increasing intake of lean protein, reducing intake of saturated and trans fat and reducing intake of cholesterol  Exercise recommendations include exercising 3-5 times per week and strength training exercises  No pharmacotherapy was ordered  Rationale for BMI follow-up plan is due to patient being overweight or obese  Subjective      Pt is here for follow-up after being seen in the emergency room  Had some dizziness Hartness of breath, just not feeling right  [place] impending doom  Patient had D-dimer test done CAT scan done to evaluate pulmonary embolism  Patient does have a history of having COVID about 3 weeks ago  Still does not feel well  Has concerns regarding cardiac and pulmonary function    Review of Systems   Constitutional: Negative  HENT: Negative  Eyes: Negative  Respiratory: Positive for chest tightness  Cardiovascular: Negative for leg swelling  Gastrointestinal: Negative  Endocrine: Negative  Genitourinary: Negative  Musculoskeletal: Negative  Allergic/Immunologic: Negative  Neurological: Positive for dizziness and numbness  Psychiatric/Behavioral: Negative  Current Outpatient Medications on File Prior to Visit   Medication Sig   • acetaminophen (TYLENOL) 325 mg tablet Take 3 tablets (975 mg total) by mouth every 6 (six) hours   • albuterol (PROVENTIL HFA,VENTOLIN HFA) 90 mcg/act inhaler INHALE 2 PUFFS EVERY 6 HOURS AS NEEDED FOR WHEEZING   • ALPRAZolam (XANAX) 0 25 mg tablet Take 1 tablet (0 25 mg total) by mouth daily as needed for anxiety   • etonogestrel (NEXPLANON) subdermal implant Inject 68 mg under the skin continuous   • FLUoxetine (PROzac) 10 MG tablet Take 1 tablet (10 mg total) by mouth daily       Objective     /80   Pulse (!) 111   Temp 98 4 °F (36 9 °C) (Oral)   Resp 16   Ht 5' 6" (1 676 m)   Wt 86 8 kg (191 lb 6 4 oz)   SpO2 98%   BMI 30 89 kg/m²     Physical Exam  Vitals and nursing note reviewed  Constitutional:       Appearance: She is well-developed  She is ill-appearing  HENT:      Head: Normocephalic and atraumatic  Right Ear: External ear normal       Left Ear: External ear normal    Eyes:      Pupils: Pupils are equal, round, and reactive to light  Cardiovascular:      Rate and Rhythm: Regular rhythm  Tachycardia present  Pulses: Normal pulses  Heart sounds: Normal heart sounds  Pulmonary:      Effort: Pulmonary effort is normal       Breath sounds: Normal breath sounds  Abdominal:      General: Bowel sounds are normal       Palpations: Abdomen is soft  Musculoskeletal:         General: Normal range of motion  Cervical back: Normal range of motion  Skin:     General: Skin is warm and dry  Neurological:      General: No focal deficit present  Mental Status: She is alert and oriented to person, place, and time  Psychiatric:         Mood and Affect: Mood is anxious  Behavior: Behavior normal          Thought Content:  Thought content normal          Judgment: Judgment normal        NOEL Robledo

## 2023-01-30 NOTE — ASSESSMENT & PLAN NOTE
Patient continues to be tachycardic  Generally not feeling well  Has been taking Xanax to help with anxiety but it is making her sleep a lot  Holter monitor ordered  Stress test ordered    Patient did have COVID about 3 weeks ago, possible post-COVID condition

## 2023-01-31 ENCOUNTER — APPOINTMENT (OUTPATIENT)
Dept: LAB | Facility: CLINIC | Age: 23
End: 2023-01-31

## 2023-01-31 DIAGNOSIS — R00.2 PALPITATIONS: ICD-10-CM

## 2023-01-31 DIAGNOSIS — D72.829 LEUKOCYTOSIS, UNSPECIFIED TYPE: ICD-10-CM

## 2023-01-31 LAB
ALBUMIN SERPL BCP-MCNC: 4 G/DL (ref 3.5–5)
ALP SERPL-CCNC: 71 U/L (ref 46–116)
ALT SERPL W P-5'-P-CCNC: 47 U/L (ref 12–78)
ANION GAP SERPL CALCULATED.3IONS-SCNC: 7 MMOL/L (ref 4–13)
AST SERPL W P-5'-P-CCNC: 14 U/L (ref 5–45)
BASOPHILS # BLD AUTO: 0.07 THOUSANDS/ÂΜL (ref 0–0.1)
BASOPHILS NFR BLD AUTO: 1 % (ref 0–1)
BILIRUB SERPL-MCNC: 0.4 MG/DL (ref 0.2–1)
BUN SERPL-MCNC: 10 MG/DL (ref 5–25)
CALCIUM SERPL-MCNC: 9.6 MG/DL (ref 8.3–10.1)
CHLORIDE SERPL-SCNC: 108 MMOL/L (ref 96–108)
CO2 SERPL-SCNC: 24 MMOL/L (ref 21–32)
CREAT SERPL-MCNC: 0.66 MG/DL (ref 0.6–1.3)
EOSINOPHIL # BLD AUTO: 0.05 THOUSAND/ÂΜL (ref 0–0.61)
EOSINOPHIL NFR BLD AUTO: 1 % (ref 0–6)
ERYTHROCYTE [DISTWIDTH] IN BLOOD BY AUTOMATED COUNT: 12.3 % (ref 11.6–15.1)
GFR SERPL CREATININE-BSD FRML MDRD: 123 ML/MIN/1.73SQ M
GLUCOSE P FAST SERPL-MCNC: 91 MG/DL (ref 65–99)
HCT VFR BLD AUTO: 42.9 % (ref 34.8–46.1)
HGB BLD-MCNC: 14.3 G/DL (ref 11.5–15.4)
IMM GRANULOCYTES # BLD AUTO: 0.03 THOUSAND/UL (ref 0–0.2)
IMM GRANULOCYTES NFR BLD AUTO: 0 % (ref 0–2)
LYMPHOCYTES # BLD AUTO: 1.99 THOUSANDS/ÂΜL (ref 0.6–4.47)
LYMPHOCYTES NFR BLD AUTO: 19 % (ref 14–44)
MCH RBC QN AUTO: 30 PG (ref 26.8–34.3)
MCHC RBC AUTO-ENTMCNC: 33.3 G/DL (ref 31.4–37.4)
MCV RBC AUTO: 90 FL (ref 82–98)
MONOCYTES # BLD AUTO: 0.77 THOUSAND/ÂΜL (ref 0.17–1.22)
MONOCYTES NFR BLD AUTO: 7 % (ref 4–12)
NEUTROPHILS # BLD AUTO: 7.86 THOUSANDS/ÂΜL (ref 1.85–7.62)
NEUTS SEG NFR BLD AUTO: 72 % (ref 43–75)
NRBC BLD AUTO-RTO: 0 /100 WBCS
PLATELET # BLD AUTO: 378 THOUSANDS/UL (ref 149–390)
PMV BLD AUTO: 10.2 FL (ref 8.9–12.7)
POTASSIUM SERPL-SCNC: 4.4 MMOL/L (ref 3.5–5.3)
PROT SERPL-MCNC: 8 G/DL (ref 6.4–8.4)
RBC # BLD AUTO: 4.77 MILLION/UL (ref 3.81–5.12)
SODIUM SERPL-SCNC: 139 MMOL/L (ref 135–147)
WBC # BLD AUTO: 10.77 THOUSAND/UL (ref 4.31–10.16)

## 2023-02-02 ENCOUNTER — HOSPITAL ENCOUNTER (OUTPATIENT)
Dept: NON INVASIVE DIAGNOSTICS | Facility: CLINIC | Age: 23
Discharge: HOME | End: 2023-02-02

## 2023-02-02 ENCOUNTER — HOSPITAL ENCOUNTER (OUTPATIENT)
Dept: PULMONOLOGY | Facility: HOSPITAL | Age: 23
End: 2023-02-02

## 2023-02-02 DIAGNOSIS — U09.9 POST COVID-19 CONDITION, UNSPECIFIED: ICD-10-CM

## 2023-02-02 DIAGNOSIS — R00.2 PALPITATIONS: ICD-10-CM

## 2023-02-02 RX ORDER — ALBUTEROL SULFATE 2.5 MG/3ML
2.5 SOLUTION RESPIRATORY (INHALATION) ONCE
Status: DISCONTINUED | OUTPATIENT
Start: 2023-02-02 | End: 2023-02-02

## 2023-02-02 RX ORDER — LEVALBUTEROL INHALATION SOLUTION 0.63 MG/3ML
SOLUTION RESPIRATORY (INHALATION)
Status: COMPLETED
Start: 2023-02-02 | End: 2023-02-02

## 2023-02-02 RX ORDER — LEVALBUTEROL INHALATION SOLUTION 0.63 MG/3ML
0.63 SOLUTION RESPIRATORY (INHALATION) ONCE
Status: COMPLETED | OUTPATIENT
Start: 2023-02-02 | End: 2023-02-02

## 2023-02-02 RX ADMIN — LEVALBUTEROL HYDROCHLORIDE 0.63 MG: 0.63 SOLUTION RESPIRATORY (INHALATION) at 13:44

## 2023-02-02 RX ADMIN — LEVALBUTEROL INHALATION SOLUTION 0.63 MG: 0.63 SOLUTION RESPIRATORY (INHALATION) at 13:44

## 2023-02-03 LAB
BACTERIA BLD CULT: NORMAL
BACTERIA BLD CULT: NORMAL

## 2023-02-09 ENCOUNTER — PROCEDURE VISIT (OUTPATIENT)
Dept: OBGYN CLINIC | Facility: CLINIC | Age: 23
End: 2023-02-09

## 2023-02-09 VITALS — SYSTOLIC BLOOD PRESSURE: 136 MMHG | BODY MASS INDEX: 31.64 KG/M2 | DIASTOLIC BLOOD PRESSURE: 84 MMHG | WEIGHT: 196 LBS

## 2023-02-09 DIAGNOSIS — Z30.46 ENCOUNTER FOR NEXPLANON REMOVAL: ICD-10-CM

## 2023-02-09 DIAGNOSIS — N83.201 RIGHT OVARIAN CYST: Primary | ICD-10-CM

## 2023-02-09 RX ORDER — NORGESTIMATE AND ETHINYL ESTRADIOL 0.25-0.035
1 KIT ORAL DAILY
Qty: 28 TABLET | Refills: 3 | Status: SHIPPED | OUTPATIENT
Start: 2023-02-09

## 2023-02-09 NOTE — ASSESSMENT & PLAN NOTE
Contraception - No contraindications; patient desires birth control and to decrease risk of cyst formation  1)  Begin the pill this week, starting with the first pill in the packet  Take it the same time daily, within the same hour time frame (such as between 8 and 9am)  2) It common to experience some irregular bleeding when newly starting the pill  This should resolve after 3 months of use  Also minor side effects such as breast tenderness, minor headaches and nausea may occur, but typically resolve after continuing on the pill for at least 3 months  3)  Call if you experience severe headaches, visual disturbances, chest pain, shortness of breath, abdominal pain, pain tingling or weakness in arms or legs  4) Advise a back-up method, like condoms, during the first month on the OCP, if misses any pills, or is put on antibiotics  Reviewed missed pill protocol;   5) Advise safe sexual practices and the importance of condoms to prevent the transmission of STDs    Will repeat US in 3 months   Return visit in 3 months for pill & blood pressure check

## 2023-02-09 NOTE — PROGRESS NOTES
Subjective:     Vega Barriga is a 20 y o  [facility] female who presents after diagnostic laparoscopy and right ovarian detorsion on 11/15/22  She was found to have an enlarged ovary without discrete cyst  She reports no further episodes of pain like her torsion  She reports that she does have pelvic discomfort that is worse with her period  She feels that she may have had a cyst burst with her last cycle  She has had a Nexplanon in place in August 2019  She did have a Nexplanon before that as well  She needs new birth control and would like to decrease her risk of cyst formation  She denies blood clots (family history of blood clot in gpa), stroke (family history of stroke in [place]), thyroid problems, or migraines with aura  Her LMP is 2/2/23 and stopped bleeding yesterday  Patient Active Problem List   Diagnosis   • Chronic fatigue   • Overweight (BMI 25 0-29  9)   • Dysthymia   • Hypovitaminosis D   • Mild intermittent asthma   • Wheezes   • Close exposure to COVID-19 virus   • Pelvic pain   • Enlarged ovary   • Ovarian torsion   • Right ovarian cyst   • Obesity (BMI 30 0-34  9)   • SIRS (systemic inflammatory response syndrome) (HCC)   • Palpitations   • Post covid-19 condition, unspecified   • Anxiety   • Encounter for Nexplanon removal     Past Medical History:   Diagnosis Date   • Anxiety 1/30/2023   • Asthma    • Dysthymia 5/15/2019   • GERD (gastroesophageal reflux disease) 1/29/2023       Objective:    Vitals: Blood pressure 136/84, weight 88 9 kg (196 lb), last menstrual period 02/02/2023, not currently breastfeeding  Body mass index is 31 64 kg/m²  Physical Exam  Vitals reviewed  Constitutional:       General: She is not in acute distress  Appearance: Normal appearance  She is well-developed  She is not ill-appearing, toxic-appearing or diaphoretic  Cardiovascular:      Rate and Rhythm: Normal rate  Pulmonary:      Effort: Pulmonary effort is normal  No respiratory distress     Abdominal: Palpations: Abdomen is soft  Skin:     General: Skin is warm and dry  Neurological:      Mental Status: She is alert and oriented to person, place, and time  Psychiatric:         Mood and Affect: Mood normal          Behavior: Behavior normal        Universal Protocol:  Consent: Verbal consent obtained  Risks and benefits: risks, benefits and alternatives were discussed  Consent given by: patient  Patient understanding: patient states understanding of the procedure being performed  Patient consent: the patient's understanding of the procedure matches consent given  Site marked: the operative site was marked  Required items: required blood products, implants, devices, and special equipment available  Patient identity confirmed: verbally with patient    Remove and insert drug implant    Date/Time: 2/9/2023 1:28 PM  Performed by: Ventura Hutchison MD  Authorized by: Ventura Hutchison MD     Indication:     Indication: Presence of non-biodegradable drug delivery implant    Pre-procedure:     Prepped with: alcohol 70% and povidone-iodine      Local anesthetic:  Lidocaine 1%    The site was cleaned and prepped in a sterile fashion: yes    Procedure:     Procedure:  Removal    Small stab incision was made in arm: yes      Left/right:  Left    Site was closed with steri-strips and pressure bandage applied: yes    Comments:      Nexplanon was removed intact and without difficulty  Patient tolerated the removal well  Assessment/Plan:    Problem List Items Addressed This Visit        Unprioritized    Right ovarian cyst - Primary     Contraception - No contraindications; patient desires birth control and to decrease risk of cyst formation  1)  Begin the pill this week, starting with the first pill in the packet  Take it the same time daily, within the same hour time frame (such as between 8 and 9am)  2) It common to experience some irregular bleeding when newly starting the pill    This should resolve after 3 months of use  Also minor side effects such as breast tenderness, minor headaches and nausea may occur, but typically resolve after continuing on the pill for at least 3 months  3)  Call if you experience severe headaches, visual disturbances, chest pain, shortness of breath, abdominal pain, pain tingling or weakness in arms or legs  4) Advise a back-up method, like condoms, during the first month on the OCP, if misses any pills, or is put on antibiotics  Reviewed missed pill protocol;   5) Advise safe sexual practices and the importance of condoms to prevent the transmission of STDs    Will repeat US in 3 months   Return visit in 3 months for pill & blood pressure check             Relevant Medications    norgestimate-ethinyl estradiol (Sprintec 28) 0 25-35 MG-MCG per tablet    Encounter for Nexplanon removal       Ryan Ross MD  2/9/2023  1:31 PM

## 2023-02-10 DIAGNOSIS — F34.1 DYSTHYMIA: ICD-10-CM

## 2023-02-10 RX ORDER — FLUOXETINE 10 MG/1
TABLET, FILM COATED ORAL
Qty: 30 TABLET | Refills: 5 | Status: SHIPPED | OUTPATIENT
Start: 2023-02-10

## 2023-02-17 ENCOUNTER — HOSPITAL ENCOUNTER (OUTPATIENT)
Dept: NON INVASIVE DIAGNOSTICS | Facility: CLINIC | Age: 23
Discharge: HOME | End: 2023-02-17

## 2023-02-17 VITALS
HEART RATE: 101 BPM | DIASTOLIC BLOOD PRESSURE: 90 MMHG | SYSTOLIC BLOOD PRESSURE: 142 MMHG | OXYGEN SATURATION: 99 % | BODY MASS INDEX: 31.5 KG/M2 | WEIGHT: 196 LBS | HEIGHT: 66 IN

## 2023-02-17 DIAGNOSIS — R00.2 PALPITATIONS: ICD-10-CM

## 2023-02-17 LAB
CHEST PAIN STATEMENT: NORMAL
MAX DIASTOLIC BP: 102 MMHG
MAX HEART RATE: 196 BPM
MAX HR PERCENT: 100 %
MAX HR: 196 BPM
MAX PREDICTED HEART RATE: 195 BPM
MAX. SYSTOLIC BP: 192 MMHG
PROTOCOL NAME: NORMAL
RATE PRESSURE PRODUCT: NORMAL
SL CV STRESS RECOVERY BP: NORMAL MMHG
SL CV STRESS RECOVERY HR: 130 BPM
SL CV STRESS RECOVERY O2 SAT: 99 %
SL CV STRESS STAGE REACHED: 3
STRESS ANGINA INDEX: 0
STRESS BASELINE BP: NORMAL MMHG
STRESS BASELINE HR: 101 BPM
STRESS O2 SAT REST: 99 %
STRESS PEAK HR: 196 BPM
STRESS POST ESTIMATED WORKLOAD: 10.1 METS
STRESS POST EXERCISE DUR MIN: 9 MIN
STRESS POST O2 SAT PEAK: 99 %
STRESS POST PEAK BP: 192 MMHG
TARGET HR FORMULA: NORMAL
TEST INDICATION: NORMAL
TIME IN EXERCISE PHASE: NORMAL

## 2023-02-21 ENCOUNTER — OFFICE VISIT (OUTPATIENT)
Dept: FAMILY MEDICINE CLINIC | Facility: CLINIC | Age: 23
End: 2023-02-21

## 2023-02-21 VITALS
WEIGHT: 195.2 LBS | SYSTOLIC BLOOD PRESSURE: 120 MMHG | BODY MASS INDEX: 31.37 KG/M2 | DIASTOLIC BLOOD PRESSURE: 80 MMHG | TEMPERATURE: 98.2 F | HEIGHT: 66 IN | OXYGEN SATURATION: 97 % | HEART RATE: 109 BPM | RESPIRATION RATE: 18 BRPM

## 2023-02-21 DIAGNOSIS — Z79.899 MEDICATION MANAGEMENT: ICD-10-CM

## 2023-02-21 DIAGNOSIS — F41.9 ANXIETY: Primary | ICD-10-CM

## 2023-02-21 LAB
CHEST PAIN STATEMENT: NORMAL
CHEST PAIN STATEMENT: NORMAL
MAX DIASTOLIC BP: 102 MMHG
MAX DIASTOLIC BP: 102 MMHG
MAX HEART RATE: 196 BPM
MAX HEART RATE: 196 BPM
MAX PREDICTED HEART RATE: 195 BPM
MAX PREDICTED HEART RATE: 195 BPM
MAX. SYSTOLIC BP: 192 MMHG
MAX. SYSTOLIC BP: 192 MMHG
PROTOCOL NAME: NORMAL
PROTOCOL NAME: NORMAL
TARGET HR FORMULA: NORMAL
TARGET HR FORMULA: NORMAL
TEST INDICATION: NORMAL
TEST INDICATION: NORMAL
TIME IN EXERCISE PHASE: NORMAL
TIME IN EXERCISE PHASE: NORMAL

## 2023-02-21 RX ORDER — BUSPIRONE HYDROCHLORIDE 5 MG/1
5 TABLET ORAL 2 TIMES DAILY
Qty: 30 TABLET | Refills: 2 | Status: SHIPPED | OUTPATIENT
Start: 2023-02-21

## 2023-02-21 RX ORDER — FLUOXETINE 20 MG/1
20 TABLET, FILM COATED ORAL DAILY
Qty: 30 TABLET | Refills: 0 | Status: SHIPPED | OUTPATIENT
Start: 2023-02-21

## 2023-02-21 NOTE — PROGRESS NOTES
Name: Kvng Phan      : 2000      MRN: [id number]  Encounter Provider: NOEL Richard  Encounter Date: 2023   Encounter department: [place] [address]  Anxiety  Assessment & Plan:  Discussed management of anxiety, patient does have palpitations  Normal cardiac results  Has had some life-changing events  Increase Prozac to 20 mg discussed we will follow-up in a month may be able to wean back down to 10 mg  Continue with BuSpar  Continue to maintain good hydration nutrition and rest   Discussed self-care  Orders:  -     FLUoxetine (PROzac) 20 MG tablet; Take 1 tablet (20 mg total) by mouth daily  -     busPIRone (BUSPAR) 5 mg tablet; Take 1 tablet (5 mg total) by mouth 2 (two) times a day    2  Medication management  -     CBC and differential; Future  -     Comprehensive metabolic panel; Future           Subjective      Patient is here for follow-up on tachycardia  Had Holter monitor done had stress test done  Patient does have a follow-up with pulmonology  Review of Systems   Constitutional: Negative  HENT: Negative  Eyes: Negative  Respiratory: Negative  Cardiovascular: Positive for palpitations  Gastrointestinal: Negative  Endocrine: Negative  Genitourinary: Negative  Musculoskeletal: Negative  Allergic/Immunologic: Negative  Neurological: Negative  Psychiatric/Behavioral: Positive for dysphoric mood and sleep disturbance  The patient is nervous/anxious          Current Outpatient Medications on File Prior to Visit   Medication Sig   • acetaminophen (TYLENOL) 325 mg tablet Take 3 tablets (975 mg total) by mouth every 6 (six) hours   • albuterol (PROVENTIL HFA,VENTOLIN HFA) 90 mcg/act inhaler INHALE 2 PUFFS EVERY 6 HOURS AS NEEDED FOR WHEEZING   • ALPRAZolam (XANAX) 0 25 mg tablet Take 1 tablet (0 25 mg total) by mouth daily as needed for anxiety   • norgestimate-ethinyl estradiol (Sprintec 28) 0 25-35 MG-MCG per tablet Take 1 tablet by mouth daily   • [DISCONTINUED] FLUoxetine (PROzac) 10 MG tablet TAKE 1 TABLET BY MOUTH EVERY DAY   • [DISCONTINUED] busPIRone (BUSPAR) 5 mg tablet Take 1 tablet (5 mg total) by mouth 2 (two) times a day       Objective     /80   Pulse (!) 109   Temp 98 2 °F (36 8 °C) (Temporal)   Resp 18   Ht 5' 6" (1 676 m)   Wt 88 5 kg (195 lb 3 2 oz)   LMP 02/02/2023 (Exact Date)   SpO2 97%   BMI 31 51 kg/m²     Physical Exam  Vitals and nursing note reviewed  Constitutional:       Appearance: Normal appearance  She is well-developed  HENT:      Head: Normocephalic and atraumatic  Cardiovascular:      Rate and Rhythm: Regular rhythm  Tachycardia present  Pulses: Normal pulses  Heart sounds: Normal heart sounds  Pulmonary:      Effort: Pulmonary effort is normal       Breath sounds: Normal breath sounds  Musculoskeletal:         General: Normal range of motion  Cervical back: Normal range of motion  Skin:     General: Skin is warm and dry  Neurological:      General: No focal deficit present  Mental Status: She is alert and oriented to person, place, and time  Psychiatric:         Mood and Affect: Mood normal          Behavior: Behavior normal          Thought Content:  Thought content normal          Judgment: Judgment normal        NOEL Troy

## 2023-02-21 NOTE — ASSESSMENT & PLAN NOTE
Discussed management of anxiety, patient does have palpitations  Normal cardiac results  Has had some life-changing events  Increase Prozac to 20 mg discussed we will follow-up in a month may be able to wean back down to 10 mg  Continue with BuSpar  Continue to maintain good hydration nutrition and rest   Discussed self-care

## 2023-02-23 ENCOUNTER — CONSULT (OUTPATIENT)
Dept: PULMONOLOGY | Facility: CLINIC | Age: 23
End: 2023-02-23

## 2023-02-23 VITALS
HEART RATE: 95 BPM | DIASTOLIC BLOOD PRESSURE: 78 MMHG | WEIGHT: 197.2 LBS | HEIGHT: 66 IN | OXYGEN SATURATION: 97 % | BODY MASS INDEX: 31.69 KG/M2 | SYSTOLIC BLOOD PRESSURE: 118 MMHG

## 2023-02-23 DIAGNOSIS — J45.40 MODERATE PERSISTENT ASTHMA WITHOUT COMPLICATION: Primary | ICD-10-CM

## 2023-02-23 DIAGNOSIS — R91.1 LUNG NODULE: ICD-10-CM

## 2023-02-23 RX ORDER — FLUTICASONE PROPIONATE AND SALMETEROL 100; 50 UG/1; UG/1
1 POWDER RESPIRATORY (INHALATION) 2 TIMES DAILY
Qty: 60 BLISTER | Refills: 3 | Status: SHIPPED | OUTPATIENT
Start: 2023-02-23

## 2023-02-23 NOTE — PROGRESS NOTES
Pulmonary Consultation   [facility] 20 y o  female MRN: [id number]  2/23/2023      Assessment:    1  Moderate persistent asthma without complication  -PFT with moderate obstruction, reversibility is only 8% however the total change in volume is 200 mL  There is significant air trapping with a normal diffusion capacity  -Is only maintained on albuterol as needed and is requiring to use it 1-2 times per day along with a nebulizer occasionally  Plan:  -Check a [facility] allergy panel  -Start on Advair  -Continue albuterol as needed    2  Lung nodule  -5 to 6 mm lung nodule at the right base  She is a non-smoker  This is most likely benign however since the entire lung was not captured, I will check a CT chest without contrast to examine the entire lung    Plan:    Diagnoses and all orders for this visit:    Lung nodule  -     CT chest without contrast; Future    Moderate persistent asthma without complication  -     [facility] Allergy Panel, Adult; Future  -     Fluticasone-Salmeterol (Advair) 100-50 mcg/dose inhaler; Inhale 1 puff 2 (two) times a day Rinse mouth after use  History of Present Illness   HPI:  [facility] is a 20 y o  female who presents for evaluation for shortness  She is accompanied by her mother who helps provide further details  She was diagnosed with asthma at age 8 at that time it was predominantly triggered by environmental changes and exercise  Throughout that time and leading into her teenage years she had multiple hospitalizations and would require nebulizer treatments and recurrent steroids  She has had recurrent sinus disease as well and has occasional postnasal drip  Currently she uses only an albuterol rescue inhaler  She uses this about 1-2 times per day and occasionally uses an albuterol nebulizer treatment  She reports that her symptoms are much more common in the winter and tend to fade away during the summer    She reports having seasonal allergies but has never had formal allergy testing  3 to 4 years ago she was living in a home that had significant amount of black mold  She is a lifelong non-smoker, denies any drugs or alcohol abuse  She is currently not working  Her family history is positive for asthma in her mother and COPD in her maternal grandmother who was a smoker  Review of Systems   Constitutional: Negative for chills, diaphoresis, fatigue and fever  HENT: Negative for congestion, ear pain, postnasal drip, rhinorrhea, sneezing, trouble swallowing and voice change  Eyes: Negative for redness and itching  Respiratory: Positive for shortness of breath and wheezing  Negative for apnea, cough, choking, chest tightness and stridor  Cardiovascular: Negative for chest pain, palpitations and leg swelling  Gastrointestinal: Negative for abdominal distention, abdominal pain, blood in stool, constipation, diarrhea, nausea and vomiting  Endocrine: Negative for polydipsia and polyuria  Genitourinary: Negative for dysuria and flank pain  Musculoskeletal: Negative for arthralgias, back pain, joint swelling and myalgias  Skin: Negative for color change, pallor and rash  Neurological: Negative for dizziness, syncope, weakness, light-headedness, numbness and headaches  Hematological: Negative for adenopathy  Psychiatric/Behavioral: Negative  Negative for agitation         Historical Information   Past Medical History:   Diagnosis Date   • Anxiety 1/30/2023   • Asthma    • Dysthymia 5/15/2019   • GERD (gastroesophageal reflux disease) 1/29/2023     Past Surgical History:   Procedure Laterality Date   • NO PAST SURGERIES     • UT LAPS ABD PRTM&OMENTUM DX W/WO SPEC BR/WA SPX Right 11/15/2022    Procedure: LAPAROSCOPY DIAGNOSTIC, detorsion of right ovary;  Surgeon: Shelli Oleary MD;  Location: [facility];  Service: Gynecology     Family History   Problem Relation Age of Onset   • Breast cancer Other    • No Known Problems Mother    • No Known Problems Father        Occupational History: Not currently employed    Social History: Lifelong non-smoker, denies any drugs or alcohol abuse    Meds/Allergies     Current Outpatient Medications:   •  acetaminophen (TYLENOL) 325 mg tablet, Take 3 tablets (975 mg total) by mouth every 6 (six) hours, Disp: , Rfl: 0  •  albuterol (PROVENTIL HFA,VENTOLIN HFA) 90 mcg/act inhaler, INHALE 2 PUFFS EVERY 6 HOURS AS NEEDED FOR WHEEZING, Disp: 8 g, Rfl: 1  •  ALPRAZolam (XANAX) 0 25 mg tablet, Take 1 tablet (0 25 mg total) by mouth daily as needed for anxiety, Disp: 15 tablet, Rfl: 0  •  busPIRone (BUSPAR) 5 mg tablet, Take 1 tablet (5 mg total) by mouth 2 (two) times a day, Disp: 30 tablet, Rfl: 2  •  FLUoxetine (PROzac) 20 MG tablet, Take 1 tablet (20 mg total) by mouth daily, Disp: 30 tablet, Rfl: 0  •  norgestimate-ethinyl estradiol (Sprintec 28) 0 25-35 MG-MCG per tablet, Take 1 tablet by mouth daily, Disp: 28 tablet, Rfl: 3  No Known Allergies    Vitals: Blood pressure 118/78, pulse 95, height 5' 6" (1 676 m), weight 89 4 kg (197 lb 3 2 oz), last menstrual period 02/02/2023, SpO2 97 %, not currently breastfeeding , Body mass index is 31 83 kg/m²  Oxygen Therapy  SpO2: 97 %  Oxygen Therapy: None (Room air)    Physical Exam  Physical Exam  Constitutional:       General: She is not in acute distress  Appearance: She is not diaphoretic  HENT:      Head: Normocephalic and atraumatic  Nose: Nose normal       Mouth/Throat:      Pharynx: No oropharyngeal exudate  Eyes:      General: No scleral icterus  Conjunctiva/sclera: Conjunctivae normal       Pupils: Pupils are equal, round, and reactive to light  Neck:      Thyroid: No thyromegaly  Vascular: No JVD  Trachea: No tracheal deviation  Cardiovascular:      Rate and Rhythm: Normal rate and regular rhythm  Heart sounds: Normal heart sounds  No murmur heard  No friction rub  No gallop     Pulmonary:      Effort: Pulmonary effort is normal  No respiratory distress  Breath sounds: Normal breath sounds  No stridor  No wheezing or rales  Abdominal:      General: Bowel sounds are normal  There is no distension  Palpations: Abdomen is soft  Tenderness: There is no abdominal tenderness  There is no guarding or rebound  Musculoskeletal:         General: No deformity  Normal range of motion  Cervical back: Normal range of motion and neck supple  Lymphadenopathy:      Cervical: No cervical adenopathy  Skin:     General: Skin is warm  Findings: No erythema or rash  Neurological:      Mental Status: She is alert and oriented to person, place, and time  Cranial Nerves: No cranial nerve deficit  Sensory: No sensory deficit  Labs: I have personally reviewed pertinent lab results  Lab Results   Component Value Date    WBC 10 77 (H) 01/31/2023    HGB 14 3 01/31/2023    HCT 42 9 01/31/2023    MCV 90 01/31/2023     01/31/2023     Lab Results   Component Value Date    CALCIUM 9 6 01/31/2023    K 4 4 01/31/2023    CO2 24 01/31/2023     01/31/2023    BUN 10 01/31/2023    CREATININE 0 66 01/31/2023     No results found for: IGE  Lab Results   Component Value Date    ALT 47 01/31/2023    AST 14 01/31/2023    ALKPHOS 71 01/31/2023       Imaging and other studies: I have personally reviewed pertinent reports  and I have personally reviewed pertinent films in PACS  I reviewed the chest x-ray from January 20, 2023-clear bilateral lung fields    I also reviewed the CT abdomen and pelvis with contrast from January 28, 2023 which captures the lower portion of the lung  Lung parenchyma appears normal overall however there is a small 5 to 6 mm lung nodule at the right base    Pulmonary function testing:   I reviewed the pulmonary function test from February 2, 2023 which shows moderate obstructive airflow defect with significant air trapping and a normal diffusion capacity    Although the percent reversibility does not meet ATS criteria for being significant, the total change in FEV1 was 200 mL  EKG, Pathology, and Other Studies: I have personally reviewed pertinent reports     and I have personally reviewed pertinent films in PACS    Connie Ugalde MD  Pulmonary and Critical Care   [facility]

## 2023-03-06 DIAGNOSIS — F41.9 ANXIETY: ICD-10-CM

## 2023-03-07 RX ORDER — BUSPIRONE HYDROCHLORIDE 5 MG/1
TABLET ORAL
Qty: 180 TABLET | Refills: 1 | Status: SHIPPED | OUTPATIENT
Start: 2023-03-07

## 2023-03-21 DIAGNOSIS — F41.9 ANXIETY: ICD-10-CM

## 2023-03-21 RX ORDER — FLUOXETINE 20 MG/1
TABLET, FILM COATED ORAL
Qty: 90 TABLET | Refills: 1 | Status: SHIPPED | OUTPATIENT
Start: 2023-03-21

## 2023-03-24 ENCOUNTER — APPOINTMENT (OUTPATIENT)
Dept: LAB | Facility: CLINIC | Age: 23
End: 2023-03-24

## 2023-03-24 ENCOUNTER — OFFICE VISIT (OUTPATIENT)
Dept: FAMILY MEDICINE CLINIC | Facility: CLINIC | Age: 23
End: 2023-03-24

## 2023-03-24 VITALS
DIASTOLIC BLOOD PRESSURE: 80 MMHG | BODY MASS INDEX: 32.3 KG/M2 | SYSTOLIC BLOOD PRESSURE: 120 MMHG | HEART RATE: 90 BPM | RESPIRATION RATE: 18 BRPM | TEMPERATURE: 97.9 F | WEIGHT: 201 LBS | HEIGHT: 66 IN | OXYGEN SATURATION: 97 %

## 2023-03-24 DIAGNOSIS — J45.40 MODERATE PERSISTENT ASTHMA WITHOUT COMPLICATION: ICD-10-CM

## 2023-03-24 DIAGNOSIS — Z79.899 MEDICATION MANAGEMENT: ICD-10-CM

## 2023-03-24 DIAGNOSIS — F41.9 ANXIETY: Primary | ICD-10-CM

## 2023-03-24 LAB
ALBUMIN SERPL BCP-MCNC: 3.9 G/DL (ref 3.5–5)
ALP SERPL-CCNC: 55 U/L (ref 46–116)
ALT SERPL W P-5'-P-CCNC: 51 U/L (ref 12–78)
ANION GAP SERPL CALCULATED.3IONS-SCNC: 3 MMOL/L (ref 4–13)
AST SERPL W P-5'-P-CCNC: 14 U/L (ref 5–45)
BASOPHILS # BLD AUTO: 0.04 THOUSANDS/ÂΜL (ref 0–0.1)
BASOPHILS NFR BLD AUTO: 0 % (ref 0–1)
BILIRUB SERPL-MCNC: 0.37 MG/DL (ref 0.2–1)
BUN SERPL-MCNC: 16 MG/DL (ref 5–25)
CALCIUM SERPL-MCNC: 9.1 MG/DL (ref 8.3–10.1)
CHLORIDE SERPL-SCNC: 105 MMOL/L (ref 96–108)
CO2 SERPL-SCNC: 27 MMOL/L (ref 21–32)
CREAT SERPL-MCNC: 0.67 MG/DL (ref 0.6–1.3)
EOSINOPHIL # BLD AUTO: 0.08 THOUSAND/ÂΜL (ref 0–0.61)
EOSINOPHIL NFR BLD AUTO: 1 % (ref 0–6)
ERYTHROCYTE [DISTWIDTH] IN BLOOD BY AUTOMATED COUNT: 12.1 % (ref 11.6–15.1)
GFR SERPL CREATININE-BSD FRML MDRD: 122 ML/MIN/1.73SQ M
GLUCOSE P FAST SERPL-MCNC: 88 MG/DL (ref 65–99)
HCT VFR BLD AUTO: 41.5 % (ref 34.8–46.1)
HGB BLD-MCNC: 13.9 G/DL (ref 11.5–15.4)
IMM GRANULOCYTES # BLD AUTO: 0.04 THOUSAND/UL (ref 0–0.2)
IMM GRANULOCYTES NFR BLD AUTO: 0 % (ref 0–2)
LYMPHOCYTES # BLD AUTO: 1.71 THOUSANDS/ÂΜL (ref 0.6–4.47)
LYMPHOCYTES NFR BLD AUTO: 18 % (ref 14–44)
MCH RBC QN AUTO: 29.9 PG (ref 26.8–34.3)
MCHC RBC AUTO-ENTMCNC: 33.5 G/DL (ref 31.4–37.4)
MCV RBC AUTO: 89 FL (ref 82–98)
MONOCYTES # BLD AUTO: 0.63 THOUSAND/ÂΜL (ref 0.17–1.22)
MONOCYTES NFR BLD AUTO: 7 % (ref 4–12)
NEUTROPHILS # BLD AUTO: 6.86 THOUSANDS/ÂΜL (ref 1.85–7.62)
NEUTS SEG NFR BLD AUTO: 74 % (ref 43–75)
NRBC BLD AUTO-RTO: 0 /100 WBCS
PLATELET # BLD AUTO: 366 THOUSANDS/UL (ref 149–390)
PMV BLD AUTO: 10.3 FL (ref 8.9–12.7)
POTASSIUM SERPL-SCNC: 4.1 MMOL/L (ref 3.5–5.3)
PROT SERPL-MCNC: 7.8 G/DL (ref 6.4–8.4)
RBC # BLD AUTO: 4.65 MILLION/UL (ref 3.81–5.12)
SODIUM SERPL-SCNC: 135 MMOL/L (ref 135–147)
WBC # BLD AUTO: 9.36 THOUSAND/UL (ref 4.31–10.16)

## 2023-03-27 NOTE — ASSESSMENT & PLAN NOTE
Patient reports doing well on the Prozac  Has not needed to take the Xanax order BuSpar  Physical health is returning to normal   Continue same dose of Prozac  Discussed self-care  Discussed coping mechanisms

## 2023-03-27 NOTE — PROGRESS NOTES
"Name: Faye Stephenson      : 2000      MRN: [id number]  Encounter Provider: NOEL Velazquez  Encounter Date: 3/24/2023   Encounter department: [address] [place] [address]  Anxiety  Assessment & Plan:  Patient reports doing well on the Prozac  Has not needed to take the Xanax order BuSpar  Physical health is returning to normal   Continue same dose of Prozac  Discussed self-care  Discussed coping mechanisms  Subjective      Patient is here for follow-up on anxiety due to health concerns  Has been taking 20 mg of Prozac  Reports that anxiety has improved  Has not needed BuSpar or Xanax  Review of Systems   Constitutional: Negative  HENT: Negative  Eyes: Negative  Respiratory: Negative  Cardiovascular: Negative  Gastrointestinal: Negative  Endocrine: Negative  Genitourinary: Negative  Musculoskeletal: Negative  Allergic/Immunologic: Negative  Neurological: Negative  Psychiatric/Behavioral: Negative  Current Outpatient Medications on File Prior to Visit   Medication Sig   • acetaminophen (TYLENOL) 325 mg tablet Take 3 tablets (975 mg total) by mouth every 6 (six) hours   • albuterol (PROVENTIL HFA,VENTOLIN HFA) 90 mcg/act inhaler INHALE 2 PUFFS EVERY 6 HOURS AS NEEDED FOR WHEEZING   • ALPRAZolam (XANAX) 0 25 mg tablet Take 1 tablet (0 25 mg total) by mouth daily as needed for anxiety   • busPIRone (BUSPAR) 5 mg tablet TAKE 1 TABLET BY MOUTH TWICE A DAY   • FLUoxetine (PROzac) 20 MG tablet TAKE 1 TABLET BY MOUTH EVERY DAY   • Fluticasone-Salmeterol (Advair) 100-50 mcg/dose inhaler Inhale 1 puff 2 (two) times a day Rinse mouth after use     • norgestimate-ethinyl estradiol (Sprintec 28) 0 25-35 MG-MCG per tablet Take 1 tablet by mouth daily       Objective     /80   Pulse 90   Temp 97 9 °F (36 6 °C) (Temporal)   Resp 18   Ht 5' 6\" (1 676 m)   Wt 91 2 kg (201 lb)   SpO2 97%   BMI 32 44 kg/m²     Physical " Exam  Vitals and nursing note reviewed  Constitutional:       Appearance: She is well-developed  She is obese  HENT:      Head: Normocephalic and atraumatic  Cardiovascular:      Rate and Rhythm: Normal rate and regular rhythm  Pulmonary:      Effort: Pulmonary effort is normal    Abdominal:      General: Bowel sounds are normal       Palpations: Abdomen is soft  Musculoskeletal:         General: Normal range of motion  Cervical back: Normal range of motion  Skin:     General: Skin is warm and dry  Neurological:      General: No focal deficit present  Mental Status: She is alert and oriented to person, place, and time  Psychiatric:         Mood and Affect: Mood normal          Behavior: Behavior normal          Thought Content:  Thought content normal          Judgment: Judgment normal        NOEL Muñoz

## 2023-03-28 DIAGNOSIS — N83.201 RIGHT OVARIAN CYST: ICD-10-CM

## 2023-03-28 LAB
A ALTERNATA IGE QN: 0.13 KUA/I
A FUMIGATUS IGE QN: <0.1 KUA/I
BERMUDA GRASS IGE QN: <0.1 KUA/I
BOXELDER IGE QN: <0.1 KUA/I
C HERBARUM IGE QN: <0.1 KUA/I
CAT DANDER IGE QN: 0.92 KUA/I
CMN PIGWEED IGE QN: <0.1 KUA/I
COMMON RAGWEED IGE QN: <0.1 KUA/I
COTTONWOOD IGE QN: <0.1 KUA/I
D FARINAE IGE QN: 16.4 KUA/I
D PTERONYSS IGE QN: 17.6 KUA/I
DOG DANDER IGE QN: 0.41 KUA/I
LONDON PLANE IGE QN: <0.1 KUA/I
MOUSE URINE PROT IGE QN: 0.13 KUA/I
MT JUNIPER IGE QN: <0.1 KUA/I
MUGWORT IGE QN: <0.1 KUA/I
P NOTATUM IGE QN: <0.1 KUA/I
ROACH IGE QN: <0.1 KUA/I
SHEEP SORREL IGE QN: <0.1 KUA/I
SILVER BIRCH IGE QN: 1.22 KUA/I
TIMOTHY IGE QN: <0.1 KUA/I
TOTAL IGE SMQN RAST: 120 KU/L (ref 0–113)
WALNUT IGE QN: 0.41 KUA/I
WHITE ASH IGE QN: 0.19 KUA/I
WHITE ELM IGE QN: 0.27 KUA/I
WHITE MULBERRY IGE QN: <0.1 KUA/I
WHITE OAK IGE QN: 1.1 KUA/I

## 2023-03-28 RX ORDER — NORGESTIMATE AND ETHINYL ESTRADIOL 0.25-0.035
1 KIT ORAL DAILY
Qty: 84 TABLET | Refills: 0 | Status: SHIPPED | OUTPATIENT
Start: 2023-03-28 | End: 2023-06-20

## 2023-05-09 ENCOUNTER — OFFICE VISIT (OUTPATIENT)
Dept: OBGYN CLINIC | Facility: CLINIC | Age: 23
End: 2023-05-09

## 2023-05-09 VITALS
BODY MASS INDEX: 32.14 KG/M2 | SYSTOLIC BLOOD PRESSURE: 118 MMHG | DIASTOLIC BLOOD PRESSURE: 82 MMHG | HEIGHT: 66 IN | WEIGHT: 200 LBS

## 2023-05-09 DIAGNOSIS — N83.201 RIGHT OVARIAN CYST: ICD-10-CM

## 2023-05-09 DIAGNOSIS — Z87.42 S/P OVARIAN CYSTECTOMY: ICD-10-CM

## 2023-05-09 DIAGNOSIS — Z98.890 S/P OVARIAN CYSTECTOMY: ICD-10-CM

## 2023-05-09 NOTE — PROGRESS NOTES
"Subjective:     Dona Foote is a 20 y o  [place] female who presents for birth control follow up  She was started on OCPs ([place] [place]) on 2/9/23 after Nexplanon removal (in place since 8/2019) on 2/9/23  She had a diagnostic laparoscopy and right ovarian detorsion 11/15/22 and was therefore started on OCPs to decrease her risk of ovarian cyst formation  She reports feeling well on the OCPs  She would eventually like to trial off OCPs and would like to conceive maybe next year? But for now will continue OCPs  Patient Active Problem List   Diagnosis   • Chronic fatigue   • Overweight (BMI 25 0-29  9)   • Dysthymia   • Hypovitaminosis D   • Mild intermittent asthma   • Wheezes   • Close exposure to COVID-19 virus   • Pelvic pain   • Enlarged ovary   • Ovarian torsion   • S/P ovarian cystectomy   • Obesity (BMI 30 0-34  9)   • SIRS (systemic inflammatory response syndrome) (HCC)   • Palpitations   • Post covid-19 condition, unspecified   • Anxiety   • Encounter for Nexplanon removal     Past Medical History:   Diagnosis Date   • Anxiety 1/30/2023   • Asthma    • Dysthymia 5/15/2019   • GERD (gastroesophageal reflux disease) 1/29/2023       Objective:    Vitals: Blood pressure 118/82, height 5' 6\" (1 676 m), weight 90 7 kg (200 lb), last menstrual period 05/02/2023, not currently breastfeeding  Body mass index is 32 28 kg/m²  Physical Exam  Vitals reviewed  Constitutional:       General: She is not in acute distress  Appearance: Normal appearance  She is well-developed  She is not ill-appearing, toxic-appearing or diaphoretic  Cardiovascular:      Rate and Rhythm: Normal rate  Pulmonary:      Effort: Pulmonary effort is normal  No respiratory distress  Skin:     General: Skin is warm and dry  Neurological:      Mental Status: She is alert and oriented to person, place, and time     Psychiatric:         Mood and Affect: Mood normal          Behavior: Behavior normal          Assessment/Plan:    Problem " List Items Addressed This Visit        Unprioritized    S/P ovarian cystectomy     Continue Sprintec as prescribed to decrease risk of ovarian cyst formation     RTO for annual exam and PRN         Relevant Medications    norgestimate-ethinyl estradiol (Sprintec 28) 0 25-35 MG-MCG per tablet         Melly Herron MD  5/16/2023  9:37 AM

## 2023-05-16 PROBLEM — Z98.890 S/P OVARIAN CYSTECTOMY: Status: ACTIVE | Noted: 2023-01-29

## 2023-05-16 PROBLEM — Z87.42 S/P OVARIAN CYSTECTOMY: Status: ACTIVE | Noted: 2023-01-29

## 2023-05-16 RX ORDER — NORGESTIMATE AND ETHINYL ESTRADIOL 0.25-0.035
1 KIT ORAL DAILY
Qty: 84 TABLET | Refills: 3 | Status: SHIPPED | OUTPATIENT
Start: 2023-05-16 | End: 2024-04-16

## 2023-05-16 NOTE — ASSESSMENT & PLAN NOTE
Continue Sprintec as prescribed to decrease risk of ovarian cyst formation     RTO for annual exam and PRN

## 2023-09-27 ENCOUNTER — TELEPHONE (OUTPATIENT)
Dept: OBGYN CLINIC | Facility: CLINIC | Age: 23
End: 2023-09-27

## 2023-09-28 ENCOUNTER — TELEPHONE (OUTPATIENT)
Dept: FAMILY MEDICINE CLINIC | Facility: CLINIC | Age: 23
End: 2023-09-28

## 2023-09-28 NOTE — TELEPHONE ENCOUNTER
Left message for patient to call back to reschedule missed appointment for her physical which is past due.

## 2023-10-16 NOTE — PROGRESS NOTES
VISIT: (+) n - comes and goes; (+) cramping - mild/menstrual like; Denies v/HA/vb/lof/edema/dv/smoking; urine neg/neg  PNVs + DHA - tolerating daily; r/marc 1 mg folic acid and DHA daily  No FM yet  Infection History: Denies any history of MRSA; Denies any history of STIs, including genital herpes; varicella - as a child and vaccinated; cats - no  Travel history - no recent travel outside the country    TV us done - single viable IUP measuring 11.8 mm by CRL at 7w2d; (+) FHM of 149 bpm; LEOLA will be 6/2/24 based on ultrasound today     Initial BW slip given and reviewed including Hgb electrophoresis, CF/SMA (patient aware to wait for prior auth phone call prior to getting done) [facility]  Referral placed for MFM to schedule early anatomy scan and review options for genetic screening  Encouraged the flu vaccine and COVID booster in pregnancy  OB ED completed by Montse Cornelius in 4 weeks for routine ob check with physical exam or sooner if needed    Ultrasound Probe Disinfection    A transvaginal ultrasound was performed.    Probe identification: probe serial number [id number]: [id number]  Disinfection process: Disinfection was performed with High Level Disinfection Process (Trophon)    Leila Rendon PA-C  10/17/23  9:44 AM

## 2023-10-17 ENCOUNTER — INITIAL PRENATAL (OUTPATIENT)
Dept: OBGYN CLINIC | Facility: CLINIC | Age: 23
End: 2023-10-17

## 2023-10-17 VITALS
WEIGHT: 213.8 LBS | HEIGHT: 66 IN | SYSTOLIC BLOOD PRESSURE: 122 MMHG | BODY MASS INDEX: 34.36 KG/M2 | DIASTOLIC BLOOD PRESSURE: 70 MMHG

## 2023-10-17 DIAGNOSIS — Z13.71 TESTING OF FEMALE FOR GENETIC DISEASE CARRIER STATUS: ICD-10-CM

## 2023-10-17 DIAGNOSIS — Z34.01 ENCOUNTER FOR SUPERVISION OF NORMAL FIRST PREGNANCY IN FIRST TRIMESTER: Primary | ICD-10-CM

## 2023-10-23 ENCOUNTER — TELEPHONE (OUTPATIENT)
Dept: OBGYN CLINIC | Facility: CLINIC | Age: 23
End: 2023-10-23

## 2023-10-23 NOTE — TELEPHONE ENCOUNTER
Patient called to schedule her nob part 2. Needs it the week of November 13. Nothing available right now. Told her we would have to call her back with a cancellation. Patient understood.

## 2023-11-06 ENCOUNTER — APPOINTMENT (OUTPATIENT)
Dept: LAB | Facility: CLINIC | Age: 23
End: 2023-11-06
Payer: COMMERCIAL

## 2023-11-06 DIAGNOSIS — Z34.01 ENCOUNTER FOR SUPERVISION OF NORMAL FIRST PREGNANCY IN FIRST TRIMESTER: ICD-10-CM

## 2023-11-06 LAB
ABO GROUP BLD: NORMAL
AMORPH URATE CRY URNS QL MICRO: ABNORMAL
BACTERIA UR QL AUTO: ABNORMAL /HPF
BASOPHILS # BLD AUTO: 0.05 THOUSANDS/ÂΜL (ref 0–0.1)
BASOPHILS NFR BLD AUTO: 0 % (ref 0–1)
BILIRUB UR QL STRIP: NEGATIVE
BLD GP AB SCN SERPL QL: NEGATIVE
CLARITY UR: ABNORMAL
COLOR UR: ABNORMAL
EOSINOPHIL # BLD AUTO: 0.05 THOUSAND/ÂΜL (ref 0–0.61)
EOSINOPHIL NFR BLD AUTO: 0 % (ref 0–6)
ERYTHROCYTE [DISTWIDTH] IN BLOOD BY AUTOMATED COUNT: 12.8 % (ref 11.6–15.1)
GLUCOSE UR STRIP-MCNC: NEGATIVE MG/DL
HBV SURFACE AG SER QL: NORMAL
HCT VFR BLD AUTO: 42.2 % (ref 34.8–46.1)
HCV AB SER QL: NORMAL
HGB BLD-MCNC: 14.1 G/DL (ref 11.5–15.4)
HGB UR QL STRIP.AUTO: NEGATIVE
HIV 1+2 AB+HIV1 P24 AG SERPL QL IA: NORMAL
HIV 2 AB SERPL QL IA: NORMAL
HIV1 AB SERPL QL IA: NORMAL
HIV1 P24 AG SERPL QL IA: NORMAL
IMM GRANULOCYTES # BLD AUTO: 0.06 THOUSAND/UL (ref 0–0.2)
IMM GRANULOCYTES NFR BLD AUTO: 1 % (ref 0–2)
KETONES UR STRIP-MCNC: NEGATIVE MG/DL
LEUKOCYTE ESTERASE UR QL STRIP: NEGATIVE
LYMPHOCYTES # BLD AUTO: 1.89 THOUSANDS/ÂΜL (ref 0.6–4.47)
LYMPHOCYTES NFR BLD AUTO: 16 % (ref 14–44)
MCH RBC QN AUTO: 29.7 PG (ref 26.8–34.3)
MCHC RBC AUTO-ENTMCNC: 33.4 G/DL (ref 31.4–37.4)
MCV RBC AUTO: 89 FL (ref 82–98)
MONOCYTES # BLD AUTO: 0.7 THOUSAND/ÂΜL (ref 0.17–1.22)
MONOCYTES NFR BLD AUTO: 6 % (ref 4–12)
MUCOUS THREADS UR QL AUTO: ABNORMAL
NEUTROPHILS # BLD AUTO: 9.12 THOUSANDS/ÂΜL (ref 1.85–7.62)
NEUTS SEG NFR BLD AUTO: 77 % (ref 43–75)
NITRITE UR QL STRIP: NEGATIVE
NON-SQ EPI CELLS URNS QL MICRO: ABNORMAL /HPF
NRBC BLD AUTO-RTO: 0 /100 WBCS
PH UR STRIP.AUTO: 6.5 [PH]
PLATELET # BLD AUTO: 362 THOUSANDS/UL (ref 149–390)
PMV BLD AUTO: 10.1 FL (ref 8.9–12.7)
PROT UR STRIP-MCNC: ABNORMAL MG/DL
RBC # BLD AUTO: 4.74 MILLION/UL (ref 3.81–5.12)
RBC #/AREA URNS AUTO: ABNORMAL /HPF
RH BLD: POSITIVE
RUBV IGG SERPL IA-ACNC: 31.2 IU/ML
SP GR UR STRIP.AUTO: 1.02 (ref 1–1.03)
SPECIMEN EXPIRATION DATE: NORMAL
TREPONEMA PALLIDUM IGG+IGM AB [PRESENCE] IN SERUM OR PLASMA BY IMMUNOASSAY: NORMAL
UROBILINOGEN UR STRIP-ACNC: <2 MG/DL
VZV IGG SER QL IA: ABNORMAL
WBC # BLD AUTO: 11.87 THOUSAND/UL (ref 4.31–10.16)
WBC #/AREA URNS AUTO: ABNORMAL /HPF

## 2023-11-06 PROCEDURE — 87340 HEPATITIS B SURFACE AG IA: CPT

## 2023-11-06 PROCEDURE — 86787 VARICELLA-ZOSTER ANTIBODY: CPT

## 2023-11-06 PROCEDURE — 86803 HEPATITIS C AB TEST: CPT

## 2023-11-06 PROCEDURE — 86780 TREPONEMA PALLIDUM: CPT

## 2023-11-06 PROCEDURE — 87086 URINE CULTURE/COLONY COUNT: CPT

## 2023-11-06 PROCEDURE — 86762 RUBELLA ANTIBODY: CPT

## 2023-11-06 PROCEDURE — 83020 HEMOGLOBIN ELECTROPHORESIS: CPT

## 2023-11-06 PROCEDURE — 86850 RBC ANTIBODY SCREEN: CPT

## 2023-11-06 PROCEDURE — 86900 BLOOD TYPING SEROLOGIC ABO: CPT

## 2023-11-06 PROCEDURE — 36415 COLL VENOUS BLD VENIPUNCTURE: CPT

## 2023-11-06 PROCEDURE — 85025 COMPLETE CBC W/AUTO DIFF WBC: CPT

## 2023-11-06 PROCEDURE — 86901 BLOOD TYPING SEROLOGIC RH(D): CPT

## 2023-11-06 PROCEDURE — 87389 HIV-1 AG W/HIV-1&-2 AB AG IA: CPT

## 2023-11-08 LAB — BACTERIA UR CULT: NORMAL

## 2023-11-09 LAB
HGB A MFR BLD: 2.5 % (ref 1.8–3.2)
HGB A MFR BLD: 97.5 % (ref 96.4–98.8)
HGB F MFR BLD: 0 % (ref 0–2)
HGB FRACT BLD-IMP: NORMAL
HGB S MFR BLD: 0 %

## 2023-11-15 ENCOUNTER — ROUTINE PRENATAL (OUTPATIENT)
Dept: OBGYN CLINIC | Facility: CLINIC | Age: 23
End: 2023-11-15

## 2023-11-15 VITALS — BODY MASS INDEX: 34.54 KG/M2 | DIASTOLIC BLOOD PRESSURE: 70 MMHG | SYSTOLIC BLOOD PRESSURE: 120 MMHG | WEIGHT: 214 LBS

## 2023-11-15 DIAGNOSIS — Z36.89 ENCOUNTER FOR OTHER SPECIFIED ANTENATAL SCREENING: ICD-10-CM

## 2023-11-15 DIAGNOSIS — Z34.02 ENCOUNTER FOR SUPERVISION OF NORMAL FIRST PREGNANCY IN SECOND TRIMESTER: Primary | ICD-10-CM

## 2023-11-15 PROCEDURE — 87070 CULTURE OTHR SPECIMN AEROBIC: CPT | Performed by: NURSE PRACTITIONER

## 2023-11-15 PROCEDURE — 87077 CULTURE AEROBIC IDENTIFY: CPT | Performed by: NURSE PRACTITIONER

## 2023-11-15 PROCEDURE — G0145 SCR C/V CYTO,THINLAYER,RESCR: HCPCS | Performed by: NURSE PRACTITIONER

## 2023-11-15 PROCEDURE — PNV: Performed by: NURSE PRACTITIONER

## 2023-11-15 PROCEDURE — 87591 N.GONORRHOEAE DNA AMP PROB: CPT | Performed by: NURSE PRACTITIONER

## 2023-11-15 PROCEDURE — 87491 CHLMYD TRACH DNA AMP PROBE: CPT | Performed by: NURSE PRACTITIONER

## 2023-11-15 NOTE — PROGRESS NOTES
Stephany Cushing is a 20 y.o. Selestino Colin at [address] She is doing well. Denies LOF/Bleeding/Cramping. Denies n/v/Ha, edema. Denies tobacco or ETOH use. Denies DV. Urine neg/neg    Visit Vitals  /70   Wt 97.1 kg (214 lb)   LMP 2023 (Exact Date)   BMI 34.54 kg/m²   OB Status Pregnant   Smoking Status Never   BSA 2.06 m²       Prenatal physical documented under rooming. Diagnoses and all orders for this visit:    Encounter for supervision of normal first pregnancy in second trimester    Encounter for other specified  screening  -     Liquid-based pap, screening  -     Chlamydia/GC amplified DNA by PCR  -     Genital Comprehensive Culture      Schedule with [facility] on 23 for her NT scan . RTO in 4 week for PNV or sooner if needed.

## 2023-11-16 DIAGNOSIS — J45.21 MILD INTERMITTENT ASTHMA WITH ACUTE EXACERBATION: ICD-10-CM

## 2023-11-16 LAB
C TRACH DNA SPEC QL NAA+PROBE: NEGATIVE
N GONORRHOEA DNA SPEC QL NAA+PROBE: NEGATIVE

## 2023-11-16 RX ORDER — ALBUTEROL SULFATE 90 UG/1
AEROSOL, METERED RESPIRATORY (INHALATION)
Qty: 8 G | Refills: 1 | Status: SHIPPED | OUTPATIENT
Start: 2023-11-16

## 2023-11-19 LAB
BACTERIA GENITAL AEROBE CULT: ABNORMAL
BACTERIA GENITAL AEROBE CULT: ABNORMAL

## 2023-11-20 ENCOUNTER — TELEPHONE (OUTPATIENT)
Dept: OBGYN CLINIC | Facility: CLINIC | Age: 23
End: 2023-11-20

## 2023-11-20 DIAGNOSIS — N76.0 BV (BACTERIAL VAGINOSIS): Primary | ICD-10-CM

## 2023-11-20 DIAGNOSIS — B96.89 BV (BACTERIAL VAGINOSIS): Primary | ICD-10-CM

## 2023-11-20 RX ORDER — METRONIDAZOLE 500 MG/1
500 TABLET ORAL EVERY 12 HOURS SCHEDULED
Qty: 14 TABLET | Refills: 0 | Status: SHIPPED | OUTPATIENT
Start: 2023-11-20 | End: 2023-11-27

## 2023-11-20 NOTE — TELEPHONE ENCOUNTER
Patient left message on machine - recently prescribed abx and wants to make sure safe to take at this stage in pregnancy or if should wait until further along to start the medication.

## 2023-11-22 LAB
LAB AP GYN PRIMARY INTERPRETATION: NORMAL
LAB AP LMP: NORMAL
Lab: NORMAL
PATH INTERP SPEC-IMP: NORMAL

## 2023-11-24 ENCOUNTER — ROUTINE PRENATAL (OUTPATIENT)
Dept: PERINATAL CARE | Facility: OTHER | Age: 23
End: 2023-11-24
Payer: COMMERCIAL

## 2023-11-24 VITALS
HEART RATE: 98 BPM | BODY MASS INDEX: 34.46 KG/M2 | HEIGHT: 66 IN | WEIGHT: 214.4 LBS | DIASTOLIC BLOOD PRESSURE: 74 MMHG | SYSTOLIC BLOOD PRESSURE: 120 MMHG

## 2023-11-24 DIAGNOSIS — Z36.82 ENCOUNTER FOR (NT) NUCHAL TRANSLUCENCY SCAN: ICD-10-CM

## 2023-11-24 DIAGNOSIS — Z34.01 ENCOUNTER FOR SUPERVISION OF NORMAL FIRST PREGNANCY IN FIRST TRIMESTER: ICD-10-CM

## 2023-11-24 DIAGNOSIS — Z3A.12 12 WEEKS GESTATION OF PREGNANCY: ICD-10-CM

## 2023-11-24 DIAGNOSIS — O12.11 PROTEINURIA AFFECTING PREGNANCY IN FIRST TRIMESTER: Primary | ICD-10-CM

## 2023-11-24 PROBLEM — N83.8 ENLARGED OVARY: Status: RESOLVED | Noted: 2022-11-15 | Resolved: 2023-11-24

## 2023-11-24 PROBLEM — O99.210 OBESITY AFFECTING PREGNANCY: Status: ACTIVE | Noted: 2023-01-29

## 2023-11-24 PROBLEM — Z20.822 CLOSE EXPOSURE TO COVID-19 VIRUS: Status: RESOLVED | Noted: 2020-12-29 | Resolved: 2023-11-24

## 2023-11-24 PROBLEM — Z87.42 S/P OVARIAN CYSTECTOMY: Status: RESOLVED | Noted: 2023-01-29 | Resolved: 2023-11-24

## 2023-11-24 PROBLEM — R65.10 SIRS (SYSTEMIC INFLAMMATORY RESPONSE SYNDROME) (HCC): Status: RESOLVED | Noted: 2023-01-29 | Resolved: 2023-11-24

## 2023-11-24 PROBLEM — Z30.46 ENCOUNTER FOR NEXPLANON REMOVAL: Status: RESOLVED | Noted: 2023-02-09 | Resolved: 2023-11-24

## 2023-11-24 PROBLEM — E66.3 OVERWEIGHT (BMI 25.0-29.9): Status: RESOLVED | Noted: 2019-04-16 | Resolved: 2023-11-24

## 2023-11-24 PROBLEM — R06.2 WHEEZES: Status: RESOLVED | Noted: 2019-09-19 | Resolved: 2023-11-24

## 2023-11-24 PROBLEM — N83.519 OVARIAN TORSION: Status: RESOLVED | Noted: 2022-11-15 | Resolved: 2023-11-24

## 2023-11-24 PROBLEM — Z98.890 S/P OVARIAN CYSTECTOMY: Status: RESOLVED | Noted: 2023-01-29 | Resolved: 2023-11-24

## 2023-11-24 PROCEDURE — 99203 OFFICE O/P NEW LOW 30 MIN: CPT | Performed by: OBSTETRICS & GYNECOLOGY

## 2023-11-24 PROCEDURE — 76813 OB US NUCHAL MEAS 1 GEST: CPT | Performed by: OBSTETRICS & GYNECOLOGY

## 2023-11-24 PROCEDURE — 76817 TRANSVAGINAL US OBSTETRIC: CPT | Performed by: OBSTETRICS & GYNECOLOGY

## 2023-11-24 RX ORDER — ASPIRIN 81 MG/1
162 TABLET, CHEWABLE ORAL DAILY
Qty: 60 TABLET | Refills: 5 | Status: SHIPPED | OUTPATIENT
Start: 2023-11-24

## 2023-11-24 NOTE — PROGRESS NOTES
Ultrasound Probe Disinfection    A transvaginal ultrasound was performed. Prior to use, disinfection was performed with High Level Disinfection Process ([employer]on). Probe serial number M3: [id number] was used.       Kapil White  11/24/23  9:33 AM

## 2023-11-24 NOTE — LETTER
November 24, 2023     Teri Parra PA-C  [address]. [place] [address]    Patient: Irma Nascimento   YOB: 2000   Date of Visit: 11/24/2023       Dear Maximus Brantley:    Thank you for referring Irma Nascimento to me for evaluation. Below are my notes for this consultation. If you have questions, please do not hesitate to call me. I look forward to following your patient along with you. Sincerely,        Sophia Ralph MD        CC: No Recipients    Sophia Ralph MD  11/24/2023 10:06 AM  Sign when Signing Visit  [address]: Ms. Cecelia Lopez was seen today at 12w5d for nuchal translucency ultrasound. See ultrasound report under "OB Procedures" tab. My recommendations are as follows: We reviewed the availability of aneuploidy screening, as well as diagnostic testing, which are available to all pregnant women. We reviewed limitations, risks, and benefits of screening and testing. She does not wish to pursue aneuploidy screening or diagnostic testing at this time. MSAFP screening should be ordered through your office at 15-20 weeks gestation, and completed prior to fetal anatomic survey. A detailed anatomic survey as well as transvaginal cervical length screening are recommended between 18-22 weeks gestation. Third trimester assessment of fetal growth is recommended based on pre-gravid BMI. The use of low dose aspirin in pregnancy (162mg) is recommended in women with a high risk, or multiple moderate risk factors for preeclampsia. Aspirin therapy should be initiated between 12-28 weeks gestation, and is most effective if started prior to 16 weeks gestation, and stopped by 36 weeks gestation. Low dose aspirin in pregnancy has been shown to reduce the incidence of preeclampsia in women with risk factors, and has been shown to be safe and without significant maternal or fetal risk.  In light of her risk factors which include primigravida, I recommend initiating aspirin therapy. Consistent exercise during pregnancy and gestational weight gain of 11-20 pounds will also reduce her risk for hypertensive complications. We reviewed her history of mild intermittent asthma. Asthma complicates 1-6% of pregnancies. The majority of women with asthma experience stability or improvement in symptoms in pregnancy, while approximately 1/3 experience worsening. Need for a rescue inhaler more than twice weekly indicates suboptimal asthma control and need for escalation in therapy. The majority of asthma medications are safe for pregnancy, and disease control is important for maternal and fetal health in pregnancy. If her symptoms remain well-controlled in pregnancy, no additional obstetric surveillance is indicated. However, if control is poor, there is a risk of fetal growth restriction, and evaluation of fetal growth in the second half of pregnancy is likely warranted. Prevention of respiratory morbidity is particularly important in pregnancy. Annual influenza recommendation is recommended, as is pneumococcal vaccination if not performed previously. She was encouraged to notify her provider if she is consistently using albuterol more than twice weekly, in which case additional treatment is necessary.     Please don't hesitate to contact our office with any concerns or questions.    -Eliud Perez MD

## 2023-11-24 NOTE — PATIENT INSTRUCTIONS
The use of low dose aspirin in pregnancy (162mg) is recommended in women with a high risk, or multiple moderate risk factors for preeclampsia. Aspirin therapy should be initiated between 12-28 weeks gestation, and is most effective if started prior to 16 weeks gestation, and continued until 36 weeks gestation. Low dose aspirin in pregnancy has been shown to reduce the incidence of preeclampsia in women with risk factors, and has been shown to be safe and without significant maternal or fetal risk. In light of your risk factors for preeclampsia, including: Primiparity (first pregnancy) and Body Mass Index 30 or greater I recommend initiating aspirin therapy. Area M Indication Text: Tumors in this location are included in Area M (cheek, forehead, scalp, neck, jawline and pretibial skin).  Mohs surgery is indicated for tumors 1 cm or larger in these anatomic locations.

## 2023-11-24 NOTE — PROGRESS NOTES
[address]: Ms. Dante Ho was seen today at 12w5d for nuchal translucency ultrasound. See ultrasound report under "OB Procedures" tab. My recommendations are as follows: We reviewed the availability of aneuploidy screening, as well as diagnostic testing, which are available to all pregnant women. We reviewed limitations, risks, and benefits of screening and testing. She does not wish to pursue aneuploidy screening or diagnostic testing at this time. MSAFP screening should be ordered through your office at 15-20 weeks gestation, and completed prior to fetal anatomic survey. A detailed anatomic survey as well as transvaginal cervical length screening are recommended between 18-22 weeks gestation. Third trimester assessment of fetal growth is recommended based on pre-gravid BMI. The use of low dose aspirin in pregnancy (162mg) is recommended in women with a high risk, or multiple moderate risk factors for preeclampsia. Aspirin therapy should be initiated between 12-28 weeks gestation, and is most effective if started prior to 16 weeks gestation, and stopped by 36 weeks gestation. Low dose aspirin in pregnancy has been shown to reduce the incidence of preeclampsia in women with risk factors, and has been shown to be safe and without significant maternal or fetal risk. In light of her risk factors which include primigravida, I recommend initiating aspirin therapy. Consistent exercise during pregnancy and gestational weight gain of 11-20 pounds will also reduce her risk for hypertensive complications. We reviewed her history of mild intermittent asthma. Asthma complicates 4-5% of pregnancies. The majority of women with asthma experience stability or improvement in symptoms in pregnancy, while approximately 1/3 experience worsening. Need for a rescue inhaler more than twice weekly indicates suboptimal asthma control and need for escalation in therapy.   The majority of asthma medications are safe for pregnancy, and disease control is important for maternal and fetal health in pregnancy. If her symptoms remain well-controlled in pregnancy, no additional obstetric surveillance is indicated. However, if control is poor, there is a risk of fetal growth restriction, and evaluation of fetal growth in the second half of pregnancy is likely warranted. Prevention of respiratory morbidity is particularly important in pregnancy. Annual influenza recommendation is recommended, as is pneumococcal vaccination if not performed previously. She was encouraged to notify her provider if she is consistently using albuterol more than twice weekly, in which case additional treatment is necessary.     Please don't hesitate to contact our office with any concerns or questions.    -Gerson Rawls MD

## 2023-12-12 NOTE — PROGRESS NOTES
OB/GYN  PN Visit  Rubi Diaz  [id number]  2023  9:21 AM  NOEL Nguyen    S: 20 y.o. Tamika Bruner 15w4d here for PN visit. Pregnancy complicated by BMI of 35. OB complaints:  Denies c/o n/v/ha, no edema, no smoking, no DV. No vb/lof  No ctxns or signs of PTL. She reports mild, occasional cramping. O:    Pre-Negra Vitals      Flowsheet Row Most Recent Value   Prenatal Assessment    Fetal Heart Rate 150   Prenatal Vitals    Blood Pressure 120/88   Weight - Scale 98.6 kg (217 lb 6.4 oz)   Urine Albumin/Glucose    Dilation/Effacement/Station    Vaginal Drainage    Edema               Gen: no acute distress, nonlabored breathing. OB exam completed: fundal height, +FHT. Urine: -/-    A/P:  #1. 15w4d GESTATION  Labor precautions reviewed  Labs UTD. Declined genetic screening at [facility]; but open to [facility]. Ordered today.      RTC in 4 weeks    NOEL Nguyen  2023  9:21 AM

## 2023-12-14 ENCOUNTER — ROUTINE PRENATAL (OUTPATIENT)
Dept: OBGYN CLINIC | Facility: CLINIC | Age: 23
End: 2023-12-14

## 2023-12-14 VITALS
HEIGHT: 66 IN | SYSTOLIC BLOOD PRESSURE: 120 MMHG | DIASTOLIC BLOOD PRESSURE: 88 MMHG | WEIGHT: 217.4 LBS | BODY MASS INDEX: 34.94 KG/M2

## 2023-12-14 DIAGNOSIS — Z34.92 SECOND TRIMESTER PREGNANCY: Primary | ICD-10-CM

## 2023-12-14 PROCEDURE — PNV

## 2023-12-20 DIAGNOSIS — Z3A.12 12 WEEKS GESTATION OF PREGNANCY: ICD-10-CM

## 2023-12-20 RX ORDER — ASPIRIN 81 MG
TABLET,CHEWABLE ORAL
Qty: 180 TABLET | Refills: 2 | Status: SHIPPED | OUTPATIENT
Start: 2023-12-20

## 2024-01-09 ENCOUNTER — ROUTINE PRENATAL (OUTPATIENT)
Dept: OBGYN CLINIC | Facility: CLINIC | Age: 24
End: 2024-01-09

## 2024-01-09 VITALS — SYSTOLIC BLOOD PRESSURE: 122 MMHG | DIASTOLIC BLOOD PRESSURE: 82 MMHG | BODY MASS INDEX: 35.83 KG/M2 | WEIGHT: 222 LBS

## 2024-01-09 DIAGNOSIS — Z34.92 PRENATAL CARE IN SECOND TRIMESTER: Primary | ICD-10-CM

## 2024-01-09 PROCEDURE — PNV: Performed by: OBSTETRICS & GYNECOLOGY

## 2024-01-09 NOTE — PROGRESS NOTES
Patient reports good fm, no n/v, headache,  bleeding, loss of fluid, edema, dom violence, or smoking.  christine pnv mild cramping with increased activity resolves with rest stretching pains.  Has  center scheduled plans to go for AFP prior to that return in 4 weeks or sooner as needed

## 2024-01-19 ENCOUNTER — ROUTINE PRENATAL (OUTPATIENT)
Dept: PERINATAL CARE | Facility: OTHER | Age: 24
End: 2024-01-19
Payer: COMMERCIAL

## 2024-01-19 VITALS
SYSTOLIC BLOOD PRESSURE: 136 MMHG | BODY MASS INDEX: 35.32 KG/M2 | WEIGHT: 219.8 LBS | HEART RATE: 104 BPM | HEIGHT: 66 IN | DIASTOLIC BLOOD PRESSURE: 80 MMHG

## 2024-01-19 DIAGNOSIS — Z3A.20 20 WEEKS GESTATION OF PREGNANCY: ICD-10-CM

## 2024-01-19 DIAGNOSIS — Z36.86 ENCOUNTER FOR ANTENATAL SCREENING FOR CERVICAL LENGTH: ICD-10-CM

## 2024-01-19 DIAGNOSIS — O99.212 OBESITY DURING PREGNANCY IN SECOND TRIMESTER: Primary | ICD-10-CM

## 2024-01-19 PROCEDURE — 76811 OB US DETAILED SNGL FETUS: CPT | Performed by: OBSTETRICS & GYNECOLOGY

## 2024-01-19 PROCEDURE — 76817 TRANSVAGINAL US OBSTETRIC: CPT | Performed by: OBSTETRICS & GYNECOLOGY

## 2024-01-19 PROCEDURE — 99213 OFFICE O/P EST LOW 20 MIN: CPT | Performed by: OBSTETRICS & GYNECOLOGY

## 2024-01-19 NOTE — PROGRESS NOTES
Ultrasound Probe Disinfection    A transvaginal ultrasound was performed.   Prior to use, disinfection was performed with High Level Disinfection Process ([employer]).  Probe serial number M2: [id number] was used.      Lisseth Pablo  01/19/24  7:48 AM

## 2024-01-19 NOTE — LETTER
January 19, 2024     Nir Barboza PA-C  [address]  [place] [address]    Patient: Gauri Spear   YOB: 2000   Date of Visit: 1/19/2024       Dear Dr. Barboza:    Thank you for referring Gauri Spear to me for evaluation. Below are my notes for this consultation.    If you have questions, please do not hesitate to call me. I look forward to following your patient along with you.         Sincerely,        Amaris Maravilla MD        CC: No Recipients    Amaris Maravilla MD  1/19/2024  6:54 PM  Sign when Signing Visit  Gauri Spear  has no complaints today at 20w5d. She reports fetal movements and does not report any vaginal bleeding or signs of labor.  She declined NIPT and has not completed her MSAFP, SMA or CF screening by today's visit. She is here today for an ultrasound for fetal anatomy.    Problem list:  Obesity based on a pregravid BMI of 34  Mild intermittent asthma    Ultrasound findings:  The ultrasound today shows normal interval fetal growth and fluid, normal cervical length, and no malformations were detected. Anatomical survey was not able to be completed today secondary to fetal position.    Pregnancy ultrasound has limitations and is unable to detect all forms of fetal congenital abnormalities.      Follow up recommended:   Recommend a 8-week ultrasound for growth and missed anatomy.    Pre visit time reviewing her records   5 minutes  Face to face time 5 minutes  Post visit time on documentation of note, updating her problem list, adding orders and prescriptions 5 minutes.  Procedures that were completed today were charged separately.   The level of decision making was low level complexity.    MD Lisseth Leong  1/19/2024  7:48 AM  Sign when Signing Visit  Ultrasound Probe Disinfection    A transvaginal ultrasound was performed.   Prior to use, disinfection was performed with High Level Disinfection Process (Trophon).  Probe serial number M2:  [id number] was used.      Lisseth Pablo  01/19/24  7:48 AM

## 2024-01-19 NOTE — PROGRESS NOTES
Gauri Spear  has no complaints today at 20w5d. She reports fetal movements and does not report any vaginal bleeding or signs of labor.  She declined NIPT and has not completed her MSAFP, SMA or CF screening by today's visit. She is here today for an ultrasound for fetal anatomy.    Problem list:  Obesity based on a pregravid BMI of 34  Mild intermittent asthma    Ultrasound findings:  The ultrasound today shows normal interval fetal growth and fluid, normal cervical length, and no malformations were detected. Anatomical survey was not able to be completed today secondary to fetal position.    Pregnancy ultrasound has limitations and is unable to detect all forms of fetal congenital abnormalities.      Follow up recommended:   Recommend a 8-week ultrasound for growth and missed anatomy.    Pre visit time reviewing her records   5 minutes  Face to face time 5 minutes  Post visit time on documentation of note, updating her problem list, adding orders and prescriptions 5 minutes.  Procedures that were completed today were charged separately.   The level of decision making was low level complexity.    Amaris Maravilla MD

## (undated) DEVICE — STERILE SURGICAL LUBRICANT,  TUBE: Brand: SURGILUBE

## (undated) DEVICE — HEAVY DUTY TABLE COVER: Brand: CONVERTORS

## (undated) DEVICE — TRAY FOLEY 16FR URIMETER SILICONE SURESTEP

## (undated) DEVICE — TROCAR: Brand: KII FIOS FIRST ENTRY

## (undated) DEVICE — TUBING SMOKE EVAC W/FILTRATION DEVICE PLUMEPORT ACTIV

## (undated) DEVICE — ADHESIVE SKIN HIGH VISCOSITY EXOFIN 1ML

## (undated) DEVICE — PREMIUM DRY TRAY LF: Brand: MEDLINE INDUSTRIES, INC.

## (undated) DEVICE — CHLORAPREP HI-LITE 26ML ORANGE

## (undated) DEVICE — VIAL DECANTER

## (undated) DEVICE — INTENDED FOR TISSUE SEPARATION, AND OTHER PROCEDURES THAT REQUIRE A SHARP SURGICAL BLADE TO PUNCTURE OR CUT.: Brand: BARD-PARKER SAFETY BLADES SIZE 11, STERILE

## (undated) DEVICE — TOWEL SURG XR DETECT GREEN STRL RFD

## (undated) DEVICE — BETHLEHEM UNIVERSAL GYN LAP PK: Brand: CARDINAL HEALTH

## (undated) DEVICE — CHLORHEXIDINE 4PCT 4 OZ

## (undated) DEVICE — DRAPE EQUIPMENT RF WAND